# Patient Record
Sex: FEMALE | Race: WHITE | NOT HISPANIC OR LATINO | URBAN - METROPOLITAN AREA
[De-identification: names, ages, dates, MRNs, and addresses within clinical notes are randomized per-mention and may not be internally consistent; named-entity substitution may affect disease eponyms.]

---

## 2024-04-17 ENCOUNTER — HOSPITAL ENCOUNTER (OUTPATIENT)
Dept: RADIOLOGY | Facility: EXTERNAL LOCATION | Age: 69
Discharge: HOME | End: 2024-04-17

## 2024-04-17 DIAGNOSIS — R05.9 COUGH, UNSPECIFIED TYPE: ICD-10-CM

## 2024-09-12 ENCOUNTER — APPOINTMENT (OUTPATIENT)
Dept: PRIMARY CARE | Facility: CLINIC | Age: 69
End: 2024-09-12
Payer: COMMERCIAL

## 2024-09-12 VITALS
OXYGEN SATURATION: 93 % | TEMPERATURE: 98.3 F | BODY MASS INDEX: 24.55 KG/M2 | DIASTOLIC BLOOD PRESSURE: 68 MMHG | WEIGHT: 134.2 LBS | SYSTOLIC BLOOD PRESSURE: 102 MMHG | HEART RATE: 110 BPM

## 2024-09-12 DIAGNOSIS — Z12.31 SCREENING MAMMOGRAM FOR BREAST CANCER: Primary | ICD-10-CM

## 2024-09-12 DIAGNOSIS — Z78.0 ENCOUNTER FOR OSTEOPOROSIS SCREENING IN ASYMPTOMATIC POSTMENOPAUSAL PATIENT: ICD-10-CM

## 2024-09-12 DIAGNOSIS — L57.0 ACTINIC KERATOSIS: ICD-10-CM

## 2024-09-12 DIAGNOSIS — Z13.820 ENCOUNTER FOR OSTEOPOROSIS SCREENING IN ASYMPTOMATIC POSTMENOPAUSAL PATIENT: ICD-10-CM

## 2024-09-12 DIAGNOSIS — R29.898 WEAKNESS OF LEFT LOWER EXTREMITY: ICD-10-CM

## 2024-09-12 PROCEDURE — 1123F ACP DISCUSS/DSCN MKR DOCD: CPT | Performed by: NURSE PRACTITIONER

## 2024-09-12 PROCEDURE — 1158F ADVNC CARE PLAN TLK DOCD: CPT | Performed by: NURSE PRACTITIONER

## 2024-09-12 PROCEDURE — 99204 OFFICE O/P NEW MOD 45 MIN: CPT | Performed by: NURSE PRACTITIONER

## 2024-09-12 ASSESSMENT — ENCOUNTER SYMPTOMS
CONSTITUTIONAL NEGATIVE: 1
SHORTNESS OF BREATH: 0
APNEA: 0
VOMITING: 0
ARTHRALGIAS: 0
CHILLS: 0
SORE THROAT: 0
COUGH: 1
WEAKNESS: 1
CONFUSION: 0
SPEECH DIFFICULTY: 0
SLEEP DISTURBANCE: 0
PALPITATIONS: 0
NERVOUS/ANXIOUS: 0
DIARRHEA: 0
WHEEZING: 0
HEADACHES: 0
ACTIVITY CHANGE: 0
ROS SKIN COMMENTS: MOLE
DIZZINESS: 0
NAUSEA: 0
MYALGIAS: 0
CONSTIPATION: 0
ABDOMINAL PAIN: 0
FEVER: 0

## 2024-09-12 NOTE — ASSESSMENT & PLAN NOTE
Physical therapy to evaluate and treat as indicated  Will discuss CT of the head at upcoming appointment.  No neurological deficits noted

## 2024-09-27 ENCOUNTER — HOSPITAL ENCOUNTER (OUTPATIENT)
Dept: RADIOLOGY | Facility: CLINIC | Age: 69
Discharge: HOME | End: 2024-09-27
Payer: MEDICARE

## 2024-09-27 VITALS — BODY MASS INDEX: 24.66 KG/M2 | HEIGHT: 62 IN | WEIGHT: 134 LBS

## 2024-09-27 DIAGNOSIS — Z13.820 ENCOUNTER FOR OSTEOPOROSIS SCREENING IN ASYMPTOMATIC POSTMENOPAUSAL PATIENT: ICD-10-CM

## 2024-09-27 DIAGNOSIS — Z12.31 SCREENING MAMMOGRAM FOR BREAST CANCER: ICD-10-CM

## 2024-09-27 DIAGNOSIS — Z78.0 ENCOUNTER FOR OSTEOPOROSIS SCREENING IN ASYMPTOMATIC POSTMENOPAUSAL PATIENT: ICD-10-CM

## 2024-09-27 PROCEDURE — 77080 DXA BONE DENSITY AXIAL: CPT

## 2024-09-27 PROCEDURE — 77067 SCR MAMMO BI INCL CAD: CPT

## 2024-10-07 ENCOUNTER — APPOINTMENT (OUTPATIENT)
Dept: PRIMARY CARE | Facility: CLINIC | Age: 69
End: 2024-10-07
Payer: MEDICARE

## 2024-10-07 VITALS
SYSTOLIC BLOOD PRESSURE: 120 MMHG | BODY MASS INDEX: 24.48 KG/M2 | DIASTOLIC BLOOD PRESSURE: 79 MMHG | TEMPERATURE: 97 F | WEIGHT: 133 LBS | HEIGHT: 62 IN | OXYGEN SATURATION: 95 % | HEART RATE: 73 BPM

## 2024-10-07 DIAGNOSIS — L30.9 DERMATITIS: ICD-10-CM

## 2024-10-07 DIAGNOSIS — Z78.0 ENCOUNTER FOR OSTEOPOROSIS SCREENING IN ASYMPTOMATIC POSTMENOPAUSAL PATIENT: ICD-10-CM

## 2024-10-07 DIAGNOSIS — R53.1 WEAKNESS OF LEFT SIDE OF BODY: ICD-10-CM

## 2024-10-07 DIAGNOSIS — F17.210 CIGARETTE SMOKER: ICD-10-CM

## 2024-10-07 DIAGNOSIS — Z13.6 SCREENING FOR CARDIOVASCULAR CONDITION: ICD-10-CM

## 2024-10-07 DIAGNOSIS — Z13.820 ENCOUNTER FOR OSTEOPOROSIS SCREENING IN ASYMPTOMATIC POSTMENOPAUSAL PATIENT: ICD-10-CM

## 2024-10-07 DIAGNOSIS — Z00.00 ROUTINE GENERAL MEDICAL EXAMINATION AT HEALTH CARE FACILITY: Primary | ICD-10-CM

## 2024-10-07 PROCEDURE — 1124F ACP DISCUSS-NO DSCNMKR DOCD: CPT | Performed by: NURSE PRACTITIONER

## 2024-10-07 PROCEDURE — 1170F FXNL STATUS ASSESSED: CPT | Performed by: NURSE PRACTITIONER

## 2024-10-07 PROCEDURE — 93000 ELECTROCARDIOGRAM COMPLETE: CPT | Performed by: NURSE PRACTITIONER

## 2024-10-07 PROCEDURE — 4004F PT TOBACCO SCREEN RCVD TLK: CPT | Performed by: NURSE PRACTITIONER

## 2024-10-07 PROCEDURE — G0439 PPPS, SUBSEQ VISIT: HCPCS | Performed by: NURSE PRACTITIONER

## 2024-10-07 PROCEDURE — 1160F RVW MEDS BY RX/DR IN RCRD: CPT | Performed by: NURSE PRACTITIONER

## 2024-10-07 PROCEDURE — 1159F MED LIST DOCD IN RCRD: CPT | Performed by: NURSE PRACTITIONER

## 2024-10-07 PROCEDURE — 3008F BODY MASS INDEX DOCD: CPT | Performed by: NURSE PRACTITIONER

## 2024-10-07 RX ORDER — ALENDRONATE SODIUM 70 MG/1
70 TABLET ORAL
Qty: 4 TABLET | Refills: 11 | Status: SHIPPED | OUTPATIENT
Start: 2024-10-07 | End: 2025-10-07

## 2024-10-07 RX ORDER — TRIAMCINOLONE ACETONIDE 1 MG/G
CREAM TOPICAL 2 TIMES DAILY
Qty: 80 G | Refills: 0 | Status: SHIPPED | OUTPATIENT
Start: 2024-10-07

## 2024-10-07 ASSESSMENT — ACTIVITIES OF DAILY LIVING (ADL)
DRESSING: INDEPENDENT
MANAGING_FINANCES: INDEPENDENT
BATHING: INDEPENDENT
DOING_HOUSEWORK: INDEPENDENT
GROCERY_SHOPPING: INDEPENDENT
TAKING_MEDICATION: INDEPENDENT

## 2024-10-07 ASSESSMENT — ENCOUNTER SYMPTOMS
FATIGUE: 0
NERVOUS/ANXIOUS: 0
CONFUSION: 0
HEADACHES: 0
FEVER: 0
RESPIRATORY NEGATIVE: 1
VOMITING: 0
ABDOMINAL PAIN: 0
COUGH: 0
DIZZINESS: 0
SHORTNESS OF BREATH: 0
APNEA: 0
CONSTITUTIONAL NEGATIVE: 1
DEPRESSION: 0
CHILLS: 0
ACTIVITY CHANGE: 0
OCCASIONAL FEELINGS OF UNSTEADINESS: 0
PALPITATIONS: 0
LOSS OF SENSATION IN FEET: 0
NAUSEA: 0
WEAKNESS: 1

## 2024-10-07 ASSESSMENT — PATIENT HEALTH QUESTIONNAIRE - PHQ9
2. FEELING DOWN, DEPRESSED OR HOPELESS: NOT AT ALL
SUM OF ALL RESPONSES TO PHQ9 QUESTIONS 1 AND 2: 0
1. LITTLE INTEREST OR PLEASURE IN DOING THINGS: NOT AT ALL

## 2024-10-07 NOTE — PROGRESS NOTES
"Subjective   Reason for Visit: Cyndi Staples is an 69 y.o. female here for a Medicare Wellness visit.     Past Medical, Surgical, and Family History reviewed and updated in chart.    Reviewed all medications by prescribing practitioner or clinical pharmacist (such as prescriptions, OTCs, herbal therapies and supplements) and documented in the medical record.    Medicare Wellness Visit     Left side weakness: Discussed at last visit.  Has not scheduled with physical therapy.  Denies any additional neurological deficits. Difficulty ambulating.     Smoker: She is a daily cigarette smoker.  1 pack/day for 20+ years  Discussed low-dose CT screening of lung    Osteoporosis: Discussed DEXA scan showing low bone mineral.  Discussed treatment options with calcium and vitamin D.  Also patient opts for Fosamax    Dermatitis: Has occasional episodes of dermatitis.  Treats with triamcinolone.  Needs refill.  Has upcoming vacation        Patient Care Team:  SHINE Garvey as PCP - General (Family Medicine)     Review of Systems   Constitutional: Negative.  Negative for activity change, chills, fatigue and fever.   Respiratory: Negative.  Negative for apnea, cough and shortness of breath.    Cardiovascular:  Negative for chest pain and palpitations.   Gastrointestinal:  Negative for abdominal pain, nausea and vomiting.   Neurological:  Positive for weakness. Negative for dizziness and headaches.        Left side   Psychiatric/Behavioral:  Negative for confusion. The patient is not nervous/anxious.        Objective   Vitals:  /79   Pulse 73   Temp 36.1 °C (97 °F)   Ht 1.575 m (5' 2\")   Wt 60.3 kg (133 lb)   SpO2 95%   BMI 24.33 kg/m²       Physical Exam  Vitals reviewed.   Constitutional:       Appearance: Normal appearance.   Cardiovascular:      Rate and Rhythm: Normal rate and regular rhythm.      Pulses: Normal pulses.      Heart sounds: Normal heart sounds.   Pulmonary:      Effort: Pulmonary effort is " normal.      Breath sounds: Normal breath sounds.   Abdominal:      General: Bowel sounds are normal.   Neurological:      Mental Status: She is alert and oriented to person, place, and time.      Motor: Weakness present.      Coordination: Coordination normal.      Gait: Gait abnormal.      Deep Tendon Reflexes: Reflexes normal.   Psychiatric:         Mood and Affect: Mood normal.         Behavior: Behavior normal.       Assessment & Plan  Cigarette smoker  Smoking cessation encouraged.  CT low-dose lung screening ordered  Orders:    CT lung screening low dose; Future    Weakness of left side of body  Order CT head without IV contrast.  PT to evaluate and treat as indicated  Orders:    CT head wo IV contrast; Future    Dermatitis  Use as needed.  Also has dermatology referral  Orders:    triamcinolone (Kenalog) 0.1 % cream; Apply topically 2 times a day. Apply to affected area 1-2 times daily as needed. Avoid face and groin.    Encounter for osteoporosis screening in asymptomatic postmenopausal patient    Orders:    alendronate (Fosamax) 70 mg tablet; Take 1 tablet (70 mg) by mouth every 7 days. Take in the morning with a full glass of water, on an empty stomach, and do not take anything else by mouth or lie down for the next 30 min.  Risk/benefits/side effects discussed  Routine general medical examination at health care facility    Orders:    1 Year Follow Up In Primary Care - Wellness Exam; Future  Will have COVID vaccination and flu vaccination done at pharmacy  EKG done, DEXA review, mammogram reviewed  Screening for cardiovascular condition    Orders:    ECG 12 Lead  EKG shows left atrial enlargement.  Has CT low-dose lung screening/suspect calcium buildup???  Blood pressure well-controlled         Cardiac Risk Assessment  15 - 20 minutes were spent discussing Cardiovascular risk and, if needed, lifestyle modifications were recommended, including nutritional choices, exercise, and elimination of habits  contributing to risk.   Aspirin use/disuse was discussed following the guidelines below:  low dose ASA ( mg) should be considered:    If prior Heart Attack/Stroke/Peripheral vascular disease:  Generally recommend daily low dose aspirin unless extremely high bleeding risk (e.g., gastrointestinal).    If no prior Heart Attack/Stroke/Peripheral vascular disease:              Age over 70: Do not use Aspirin for prevention    Age less than 70 and 10-year cardiovascular disease risk is >20%: use low dose Aspirin for prevention.

## 2024-10-07 NOTE — ASSESSMENT & PLAN NOTE
Orders:    alendronate (Fosamax) 70 mg tablet; Take 1 tablet (70 mg) by mouth every 7 days. Take in the morning with a full glass of water, on an empty stomach, and do not take anything else by mouth or lie down for the next 30 min.  Risk/benefits/side effects discussed

## 2024-10-07 NOTE — ASSESSMENT & PLAN NOTE
Use as needed.  Also has dermatology referral  Orders:    triamcinolone (Kenalog) 0.1 % cream; Apply topically 2 times a day. Apply to affected area 1-2 times daily as needed. Avoid face and groin.

## 2024-10-07 NOTE — ASSESSMENT & PLAN NOTE
Orders:    1 Year Follow Up In Primary Care - Wellness Exam; Future  Will have COVID vaccination and flu vaccination done at pharmacy  EKG done, DEXA review, mammogram reviewed

## 2024-10-07 NOTE — PATIENT INSTRUCTIONS
Codes for insurance    CT of head, R53.1  CT/low-dose lung screening,  F17.210    Physical therapy, R53.1            products:   Take on an empty stomach before breakfast.  Take at least 30 minutes before the first food, drink, or drugs of the day.  Do not lie down for at least 30 minutes after taking this drug and until you eat your first food of the day.  Keep taking this drug as you have been told by your doctor or other health care provider, even if you feel well.  Tablets:   Take with a full glass of water.  Take with plain water only. Avoid taking with mineral water, milk, or other drinks.  Swallow whole. Do not chew, break, or crush.  Do not suck on this product.

## 2024-10-07 NOTE — ASSESSMENT & PLAN NOTE
Smoking cessation encouraged.  CT low-dose lung screening ordered  Orders:    CT lung screening low dose; Future

## 2024-10-07 NOTE — ASSESSMENT & PLAN NOTE
Order CT head without IV contrast.  PT to evaluate and treat as indicated  Orders:    CT head wo IV contrast; Future

## 2024-10-07 NOTE — ASSESSMENT & PLAN NOTE
Orders:    ECG 12 Lead  EKG shows left atrial enlargement.  Has CT low-dose lung screening/suspect calcium buildup???  Blood pressure well-controlled

## 2024-10-30 ENCOUNTER — HOSPITAL ENCOUNTER (OUTPATIENT)
Dept: RADIOLOGY | Facility: CLINIC | Age: 69
Discharge: HOME | End: 2024-10-30
Payer: MEDICARE

## 2024-10-30 DIAGNOSIS — R53.1 WEAKNESS OF LEFT SIDE OF BODY: ICD-10-CM

## 2024-10-30 DIAGNOSIS — F17.210 CIGARETTE SMOKER: ICD-10-CM

## 2024-10-30 PROCEDURE — 70450 CT HEAD/BRAIN W/O DYE: CPT

## 2024-10-30 PROCEDURE — 71271 CT THORAX LUNG CANCER SCR C-: CPT

## 2024-10-30 PROCEDURE — 70450 CT HEAD/BRAIN W/O DYE: CPT | Performed by: RADIOLOGY

## 2024-10-30 PROCEDURE — 71271 CT THORAX LUNG CANCER SCR C-: CPT | Performed by: RADIOLOGY

## 2024-11-08 ENCOUNTER — APPOINTMENT (OUTPATIENT)
Dept: PRIMARY CARE | Facility: CLINIC | Age: 69
End: 2024-11-08
Payer: MEDICARE

## 2024-11-08 VITALS
WEIGHT: 134.8 LBS | HEART RATE: 59 BPM | RESPIRATION RATE: 16 BRPM | SYSTOLIC BLOOD PRESSURE: 128 MMHG | DIASTOLIC BLOOD PRESSURE: 79 MMHG | BODY MASS INDEX: 24.66 KG/M2 | OXYGEN SATURATION: 95 % | TEMPERATURE: 96.6 F

## 2024-11-08 DIAGNOSIS — D16.9 OSTEOMA: ICD-10-CM

## 2024-11-08 DIAGNOSIS — Z13.29 THYROID DISORDER SCREEN: ICD-10-CM

## 2024-11-08 DIAGNOSIS — Z12.11 SCREENING FOR COLON CANCER: ICD-10-CM

## 2024-11-08 DIAGNOSIS — Z13.220 SCREENING, LIPID: ICD-10-CM

## 2024-11-08 DIAGNOSIS — J43.9 PULMONARY EMPHYSEMA, UNSPECIFIED EMPHYSEMA TYPE (MULTI): ICD-10-CM

## 2024-11-08 DIAGNOSIS — R29.898 WEAKNESS OF LEFT LOWER EXTREMITY: Primary | ICD-10-CM

## 2024-11-08 PROCEDURE — 99214 OFFICE O/P EST MOD 30 MIN: CPT | Performed by: NURSE PRACTITIONER

## 2024-11-08 PROCEDURE — 4004F PT TOBACCO SCREEN RCVD TLK: CPT | Performed by: NURSE PRACTITIONER

## 2024-11-08 PROCEDURE — 1160F RVW MEDS BY RX/DR IN RCRD: CPT | Performed by: NURSE PRACTITIONER

## 2024-11-08 PROCEDURE — 1124F ACP DISCUSS-NO DSCNMKR DOCD: CPT | Performed by: NURSE PRACTITIONER

## 2024-11-08 PROCEDURE — 1159F MED LIST DOCD IN RCRD: CPT | Performed by: NURSE PRACTITIONER

## 2024-11-08 ASSESSMENT — ENCOUNTER SYMPTOMS
RESPIRATORY NEGATIVE: 1
DIZZINESS: 0
HEADACHES: 0
APNEA: 0
NERVOUS/ANXIOUS: 0
WEAKNESS: 1
COUGH: 0
ACTIVITY CHANGE: 0
PALPITATIONS: 0
SHORTNESS OF BREATH: 0
ABDOMINAL PAIN: 0
VOMITING: 0
NUMBNESS: 1
CHILLS: 0
FATIGUE: 0
NAUSEA: 0
CONFUSION: 0
FEVER: 0
CONSTITUTIONAL NEGATIVE: 1

## 2024-11-08 NOTE — PROGRESS NOTES
Subjective   Patient ID: Cyndi Staples is a 69 y.o. female who presents for Left lower extremity weakness, Osteoma, Lung Nodule, and Osteoporosis.    Osteoporosis: Stable on Fosamax.  Discussed DEXA scan showing osteoporosis    Left lower extremity weakness: Discussed CT of the head/coincidental finding of osteoma.  Discussed sending patient to ENT for evaluation    Low back pain, left lower extremity weakness: Discussed with Dr. Kauffman for professional opinion.  Discussed having MRI L-spine completed due to weakness, pain and numbness of left lower extremity that is progressively worsening and affecting patient's activities of daily living.    Low-dose lung screening discussed: Findings of mild emphysema.  Asymptomatic.  Former smoker         Review of Systems   Constitutional: Negative.  Negative for activity change, chills, fatigue and fever.   Respiratory: Negative.  Negative for apnea, cough and shortness of breath.    Cardiovascular:  Negative for chest pain and palpitations.   Gastrointestinal:  Negative for abdominal pain, nausea and vomiting.   Neurological:  Positive for weakness and numbness. Negative for dizziness and headaches.   Psychiatric/Behavioral:  Negative for confusion. The patient is not nervous/anxious.        Objective   /79   Pulse 59   Temp 35.9 °C (96.6 °F)   Resp 16   Wt 61.1 kg (134 lb 12.8 oz)   SpO2 95%   BMI 24.66 kg/m²     Physical Exam  Vitals reviewed.   Constitutional:       Appearance: Normal appearance.   HENT:      Head: Normocephalic.   Cardiovascular:      Rate and Rhythm: Normal rate and regular rhythm.      Pulses: Normal pulses.      Heart sounds: Normal heart sounds.   Pulmonary:      Effort: Pulmonary effort is normal. No respiratory distress.      Breath sounds: Normal breath sounds. No wheezing.   Abdominal:      General: Bowel sounds are normal.   Musculoskeletal:      Left lower leg: No swelling or tenderness. No edema.   Neurological:      General: No  focal deficit present.      Mental Status: She is alert and oriented to person, place, and time.   Psychiatric:         Mood and Affect: Mood normal.         Behavior: Behavior normal.         Assessment/Plan   Problem List Items Addressed This Visit             ICD-10-CM    Weakness of left lower extremity - Primary R29.898     PT to evaluate and treat as indicated  MRI L-spine         Relevant Orders    MR lumbar spine wo IV contrast    Comprehensive Metabolic Panel    CBC and Auto Differential    Osteoma D16.9     Referral to ENT for evaluation         Relevant Orders    Referral to ENT    Pulmonary emphysema (Multi) J43.9     Albuterol inhaler as needed every 4-6 hours for shortness of breath or wheezing  Asymptomatic         Relevant Medications    albuterol 90 mcg/actuation aerosol powdr breath activated inhaler    Other Relevant Orders    Comprehensive Metabolic Panel    Screening for colon cancer Z12.11     Cologuard ordered         Relevant Orders    Cologuard® colon cancer screening     Other Visit Diagnoses         Codes    Screening, lipid     Z13.220    Relevant Orders    Lipid Panel    Thyroid disorder screen     Z13.29    Relevant Orders    TSH with reflex to Free T4 if abnormal

## 2024-12-04 ENCOUNTER — EVALUATION (OUTPATIENT)
Dept: PHYSICAL THERAPY | Facility: CLINIC | Age: 69
End: 2024-12-04
Payer: MEDICARE

## 2024-12-04 DIAGNOSIS — R29.898 WEAKNESS OF LEFT LOWER EXTREMITY: Primary | ICD-10-CM

## 2024-12-04 PROCEDURE — 97161 PT EVAL LOW COMPLEX 20 MIN: CPT | Mod: GP | Performed by: PHYSICAL THERAPIST

## 2024-12-04 PROCEDURE — 97110 THERAPEUTIC EXERCISES: CPT | Mod: GP | Performed by: PHYSICAL THERAPIST

## 2024-12-04 ASSESSMENT — ENCOUNTER SYMPTOMS
DEPRESSION: 0
OCCASIONAL FEELINGS OF UNSTEADINESS: 0
LOSS OF SENSATION IN FEET: 0

## 2024-12-04 ASSESSMENT — PAIN SCALES - GENERAL: PAINLEVEL_OUTOF10: 0 - NO PAIN

## 2024-12-04 ASSESSMENT — PATIENT HEALTH QUESTIONNAIRE - PHQ9
SUM OF ALL RESPONSES TO PHQ9 QUESTIONS 1 AND 2: 0
1. LITTLE INTEREST OR PLEASURE IN DOING THINGS: NOT AT ALL
2. FEELING DOWN, DEPRESSED OR HOPELESS: NOT AT ALL

## 2024-12-04 ASSESSMENT — PAIN - FUNCTIONAL ASSESSMENT: PAIN_FUNCTIONAL_ASSESSMENT: 0-10

## 2024-12-04 NOTE — PROGRESS NOTES
Physical Therapy    Physical Therapy Lumbar Spine Evaluation    Patient Name: Cyndi Staples  MRN: [id number]  Today's Date: 2024                      Visit Number: 1  Auth Dates: MN    Current Problem  Problem List Items Addressed This Visit             ICD-10-CM    Weakness of left lower extremity R29.898          General  Name and  confirmed with patient on this date.       Ambulatory Screening Summary     Screening  Frequency  Date Last Completed   Spiritual and Cultural Beliefs   Screening  each visit or episode of care 2024   Falls Risk Screening  every ambulatory visit 2024  9:58 AM   Pain Screening  annually at primary care visit     Domestic Violence screening  annually at primary care visit    Elder Abuse Screening  annually at primary care visit    Depression Screening  annually in the primary care setting 2024   Suicide Risk Screening  annually in the primary care setting    Nutrition and Food Insecurity   Screening  at least annually at primary care visit     Key Learner  annually in the primary care setting 2024   Drug Screen  2024  9:29 AM   Alcohol Screen  2024  9:29 AM   Advance Directive  2024         Precautions  Precautions  STEADI Fall Risk Score (The score of 4 or more indicates an increased risk of falling): 2  Precautions Comment: none       Pain  Pain Assessment: 0-10  0-10 (Numeric) Pain Score: 0 - No pain    SUBJECTIVE:   Chief complaint:  Pt is a 68 yo female who presents with left LE weakness and occasional aching in left leg. Pt reports that this started about 1 1/2 years ago. Pt reports that she doesn't have any pain. Feels like left leg gives out at times. Difficulty getting left leg into/out of car. Pt had work up and CVA was ruled out. Weakness affects gait. Ascends/descends stairs one at a time. Difficulty descending stairs due to left LE weakness. MRI ordered for back to be done in .    Pain Better:  Pain Worse:  Imaging:  Prior level of  function: Independent  Current limitations:   Home setup: Two story home  Work: Retired  Patients goal:Improve ability to walk  Prior tx: none    OBJECTIVE:        Spine ROM: WNL Unless documented below:  ROM (In degrees)   Flexion 80   Extension 3    Right Left   Side Bend 13 13   Rotation         Lower Extremity Strength: (5/5 unless noted)   RIGHT LEFT   Hip Flexion  3+/5   Hip Abduction     Knee Extension  3+/5   Knee Flexion  4/5   Ankle DF  4/5   Ankle EV     Gr. Toe Extension           Neurological symptoms none  Special tests:  Slump negative  SLR negative    Gait: Ambulates with decreased stance time on left LE during gait    Stairs: Pt ascends/descends stairs one at a time using right LE to lift/lower body weigt  Response to Ken Repeated Testing:      Other Measures  Oswestry Disablity Index (WALT): 31     TREATMENT:  EXERCISES Date 12/4/24 Date Date Date    REPS REPS REPS REPS          Nustep L5  8 mins                    Shuttle  DLP 4B  3 X 10      Shuttle SLP 2B  3 X 10      Shuttle TR/HR              Qhip Flexion 20#  2 X 10      Qhip Extension       Qhip Abduction 20#  2 X 10      Qhip  TKE              Q Quad       Q Hamstring               Back extension              Mid rows       Pull downs                                                                           ASSESSEMENT  Pt is a 69 y.o.  referred for physical therapy by Rita Ch APRN-CNP  for left LE weakness. Pt presents with left LE weakness, trunk weakness, altered gait. This patient would benefit from a therapy program to restore prior level of function, decrease pain, increase AROM, increase strength and improve posture.    The physical therapy prognosis is good for the patient to achieve their goals.   The pt tolerated therapy treatment today with no adverse effects.  Barriers to therapy/learning include:  none    PLAN  The pt will be seen 2 days a week for 4-8 weeks.      The pt has been educated about the risks and benefits  of physical therapy and gives consent for treatment.     The patient will benefit from physical therapy treatment to include: trunk and LE strengthening, gait training, modalities PRN     Goals:  Improve left LE strength by at least 1 MMT grade to improve ability to perform functional activities such as lifting left leg into car-4 weeks  Pt will be able to ambulate with no iheqdplgj-2-5 weeks  Pt will have sufficient functional strength of left LE to ascend/descend stairs with reciprocal pattern and no ofbdujjvinjn-9-2 weeks  Pt will be able to perform a squat to 70 degrees with equal weight distribution and ability to return to standing without UE support-6-8 weeks  Improve LEFS score > 45 to facilitate return to prior level of function-6-8 weeks

## 2024-12-07 LAB — NONINV COLON CA DNA+OCC BLD SCRN STL QL: NEGATIVE

## 2024-12-10 ENCOUNTER — TREATMENT (OUTPATIENT)
Dept: PHYSICAL THERAPY | Facility: CLINIC | Age: 69
End: 2024-12-10
Payer: MEDICARE

## 2024-12-10 DIAGNOSIS — R29.898 WEAKNESS OF LEFT LOWER EXTREMITY: Primary | ICD-10-CM

## 2024-12-10 PROCEDURE — 97110 THERAPEUTIC EXERCISES: CPT | Mod: GP,CQ

## 2024-12-10 ASSESSMENT — PAIN SCALES - GENERAL: PAINLEVEL_OUTOF10: 0 - NO PAIN

## 2024-12-10 ASSESSMENT — PAIN - FUNCTIONAL ASSESSMENT: PAIN_FUNCTIONAL_ASSESSMENT: 0-10

## 2024-12-10 NOTE — PROGRESS NOTES
"Physical Therapy    Physical Therapy Treatment    Patient Name: Cyndi Staples  MRN: [id number]  Today's Date: 12/10/2024    Time Entry:   Time Calculation  Start Time: 0801  Stop Time: 0840  Time Calculation (min): 39 min     PT Therapeutic Procedures Time Entry  Therapeutic Exercise Time Entry: 39               Assessment:   Min cueing needed to engage abdominals while doing the back extension and Q-hip exercises. Pt had moderate left LE fatigue post session. No increase in pain.    Plan:   Assess tolerance of today's visit. Progress LE strengthening exercises for return to PLOF.    Current Problem  1. Weakness of left lower extremity  Follow Up In Physical Therapy              Subjective   Pt reports no leg pain or resent episodes of her leg \"giving out\". Going up and down the stairs is challenging.    Precautions   Precautions  STEADI Fall Risk Score (The score of 4 or more indicates an increased risk of falling): 2  Precautions Comment: none      Pain  Pain Assessment  Pain Assessment: 0-10  0-10 (Numeric) Pain Score: 0 - No pain    Objective   Added shuttle HR, ham curl machine, back extension machine    Treatments:  EXERCISES Date 12/4/24 Date 12/10/24 Date Date    REPS REPS REPS REPS          Nustep L5  8 mins L5 10 min                   Shuttle  DLP 4B  3 X 10 3B 30x     Shuttle SLP 2B  3 X 10 2B 2x15 ea     Shuttle TR/HR  3B 3x10            Qhip Flexion 20#  2 X 10 20# 2x10 ea     Qhip Extension       Qhip Abduction 20#  2 X 10 20# 2x10 eaaa     Qhip  TKE              Q Quad  20# 2x10     Q Hamstring               Back extension  30# 3x10            Mid rows       Pull downs                                                                          OP EDUCATION:   Not today    Goals:  Improve left LE strength by at least 1 MMT grade to improve ability to perform functional activities such as lifting left leg into car-4 weeks  Pt will be able to ambulate with no jacxbvxah-9-7 weeks  Pt will have sufficient " functional strength of left LE to ascend/descend stairs with reciprocal pattern and no kpnhqpppmygr-1-5 weeks  Pt will be able to perform a squat to 70 degrees with equal weight distribution and ability to return to standing without UE support-6-8 weeks  Improve LEFS score > 45 to facilitate return to prior level of function-6-8 weeks

## 2024-12-16 ENCOUNTER — TREATMENT (OUTPATIENT)
Dept: PHYSICAL THERAPY | Facility: CLINIC | Age: 69
End: 2024-12-16
Payer: MEDICARE

## 2024-12-16 DIAGNOSIS — R29.898 WEAKNESS OF LEFT LOWER EXTREMITY: Primary | ICD-10-CM

## 2024-12-16 PROCEDURE — 97110 THERAPEUTIC EXERCISES: CPT | Mod: GP,CQ

## 2024-12-16 ASSESSMENT — PAIN - FUNCTIONAL ASSESSMENT: PAIN_FUNCTIONAL_ASSESSMENT: 0-10

## 2024-12-16 ASSESSMENT — PAIN SCALES - GENERAL: PAINLEVEL_OUTOF10: 0 - NO PAIN

## 2024-12-16 NOTE — PROGRESS NOTES
"Physical Therapy    Physical Therapy Treatment    Patient Name: Cyndi Staples  MRN: [id number]  Today's Date: 12/16/2024    Time Entry:   Time Calculation  Start Time: 0330  Stop Time: 0415  Time Calculation (min): 45 min     PT Therapeutic Procedures Time Entry  Therapeutic Exercise Time Entry: 45               Assessment:   Pt is challenged with left forward step ups because it is difficult to actively lift her leg.   No increase in left leg pain at the end of her session. Moderate fatigue.    Plan:   Assess tolerance of today's visit. Progress LE strengthening exercises for return to PLOF.    MMT left LE in deficit areas next visit.    Current Problem  1. Weakness of left lower extremity  Follow Up In Physical Therapy              Subjective   Pt reports that she felt \"ok\" after her last PT session. Lateral thigh soreness yesterday. Unsure of the cause.    Precautions   Precautions  STEADI Fall Risk Score (The score of 4 or more indicates an increased risk of falling): 2  Precautions Comment: none      Pain  Pain Assessment  Pain Assessment: 0-10  0-10 (Numeric) Pain Score: 0 - No pain    Objective   Added forward step ups and T-band extension and row DLS    Treatments:  EXERCISES Date 12/4/24 Date 12/10/24 Date 12/16/24 Date    REPS REPS REPS REPS          Nustep L5  8 mins L5 10 min L5 10 min                  Shuttle  DLP 4B  3 X 10 3B 30x 3B 30x    Shuttle SLP 2B  3 X 10 2B 2x15 ea 2B 2x15 ea    Shuttle TR/HR  3B 3x10 3B 3x10           Qhip Flexion 20#  2 X 10 20# 2x10 ea 20# 2x10    Qhip Extension       Qhip Abduction 20#  2 X 10 20# 2x10 ea 20# 2x10    Qhip  TKE              Q Quad       Q Hamstring   20# 2x10 20# 2x10           Back extension  30# 3x10 30# 3x10           Mid rows   Green 20x    Pull downs   Green 20x           Forward step ups   4\" 2x10 R/L    4 stairs                                                     OP EDUCATION:   Not today    Goals:  Improve left LE strength by at least 1 MMT grade to " improve ability to perform functional activities such as lifting left leg into car-4 weeks  Pt will be able to ambulate with no rhvpszprj-1-4 weeks  Pt will have sufficient functional strength of left LE to ascend/descend stairs with reciprocal pattern and no rnapyajrboiu-8-0 weeks  Pt will be able to perform a squat to 70 degrees with equal weight distribution and ability to return to standing without UE support-6-8 weeks  Improve LEFS score > 45 to facilitate return to prior level of function-6-8 weeks

## 2024-12-18 ENCOUNTER — TREATMENT (OUTPATIENT)
Dept: PHYSICAL THERAPY | Facility: CLINIC | Age: 69
End: 2024-12-18
Payer: MEDICARE

## 2024-12-18 DIAGNOSIS — R29.898 WEAKNESS OF LEFT LOWER EXTREMITY: Primary | ICD-10-CM

## 2024-12-18 PROCEDURE — 97110 THERAPEUTIC EXERCISES: CPT | Mod: GP | Performed by: PHYSICAL THERAPIST

## 2024-12-18 ASSESSMENT — PAIN SCALES - GENERAL: PAINLEVEL_OUTOF10: 0 - NO PAIN

## 2024-12-18 ASSESSMENT — PAIN - FUNCTIONAL ASSESSMENT: PAIN_FUNCTIONAL_ASSESSMENT: 0-10

## 2024-12-18 NOTE — PROGRESS NOTES
"Physical Therapy    Physical Therapy Treatment    Patient Name: Cyndi Staples  MRN: [id number]  Today's Date: 12/18/2024    Time Entry:   Time Calculation  Start Time: 0930  Stop Time: 1012  Time Calculation (min): 42 min     PT Therapeutic Procedures Time Entry  Therapeutic Exercise Time Entry: 42              Visit: 4  Auth: MN     Assessment:  Doing well with progression of strengthening exercises. Left LE fatigues quickly.    Plan:   Assess tolerance of today's visit. Progress LE strengthening exercises for return to PLOF.      Current Problem  1. Weakness of left lower extremity  Follow Up In Physical Therapy              Subjective   Pt reports that she does not have any pain today. Feels that she is tolerating PT well.    Precautions  Precautions  Precautions Comment: nonePrecautions  STEADI Fall Risk Score (The score of 4 or more indicates an increased risk of falling): 2  Precautions Comment: none      Pain  Pain Assessment  Pain Assessment: 0-10  0-10 (Numeric) Pain Score: 0 - No pain    Objective   Gait: pt ambulates with decreased stance time on left LE    Treatments:  EXERCISES Date 12/4/24 Date 12/10/24 Date 12/16/24 Date 12/18/24    REPS REPS REPS REPS          Nustep L5  8 mins L5 10 min L5 10 min L5  10 mins                 Shuttle  DLP 4B  3 X 10 3B 30x 3B 30x 3B  30X   Shuttle SLP 2B  3 X 10 2B 2x15 ea 2B 2x15 ea 2B 30X   Shuttle TR/HR  3B 3x10 3B 3x10 3B  30X          Qhip Flexion 20#  2 X 10 20# 2x10 ea 20# 2x10 30#  2 X 10   Qhip Extension       Qhip Abduction 20#  2 X 10 20# 2x10 ea 20# 2x10 30#  2 X 10   Qhip  TKE              Q Quad       Q Hamstring   20# 2x10 20# 2x10 20#  2 X 10          Back extension  30# 3x10 30# 3x10 30#  3 X 10          Mid rows   Green 20x Green 20X   Pull downs   Green 20x Green 20X          Forward step ups   4\" 2x10 R/L 4\" 2 X 10 R/L   4 stairs                                                     OP EDUCATION:   Not today    Goals:  Improve left LE strength by at " least 1 MMT grade to improve ability to perform functional activities such as lifting left leg into car-4 weeks  Pt will be able to ambulate with no alecilakz-7-1 weeks  Pt will have sufficient functional strength of left LE to ascend/descend stairs with reciprocal pattern and no lvzjijkwxrai-6-7 weeks  Pt will be able to perform a squat to 70 degrees with equal weight distribution and ability to return to standing without UE support-6-8 weeks  Improve LEFS score > 45 to facilitate return to prior level of function-6-8 weeks

## 2025-01-03 ENCOUNTER — TREATMENT (OUTPATIENT)
Dept: PHYSICAL THERAPY | Facility: CLINIC | Age: 70
End: 2025-01-03
Payer: MEDICARE

## 2025-01-03 DIAGNOSIS — R29.898 WEAKNESS OF LEFT LOWER EXTREMITY: Primary | ICD-10-CM

## 2025-01-03 PROCEDURE — 97110 THERAPEUTIC EXERCISES: CPT | Mod: GP,CQ

## 2025-01-03 ASSESSMENT — PAIN - FUNCTIONAL ASSESSMENT: PAIN_FUNCTIONAL_ASSESSMENT: 0-10

## 2025-01-03 ASSESSMENT — PAIN DESCRIPTION - DESCRIPTORS: DESCRIPTORS: ACHING

## 2025-01-03 ASSESSMENT — PAIN SCALES - GENERAL: PAINLEVEL_OUTOF10: 2

## 2025-01-03 NOTE — PROGRESS NOTES
Physical Therapy    Physical Therapy Treatment    Patient Name: Cyndi Staples  MRN: [id number]  Today's Date: 1/3/2025    Time Entry:   Time Calculation  Start Time: 0800  Stop Time: 0842  Time Calculation (min): 42 min     PT Therapeutic Procedures Time Entry  Therapeutic Exercise Time Entry: 42              Visit: 5  Auth: MN     Assessment:  Pt completed progressed exercises without increased left LE soreness.     Plan:    Progress LE strengthening exercises for return to PLOF.     MMT in deficit areas.      Current Problem  1. Weakness of left lower extremity  Follow Up In Physical Therapy              Subjective   Pt reports that her left leg has been achy the past couple days. She is unsure of the cause.    Precautions   Precautions  STEADI Fall Risk Score (The score of 4 or more indicates an increased risk of falling): 2  Precautions Comment: none      Pain  Pain Assessment  Pain Assessment: 0-10  0-10 (Numeric) Pain Score: 2  Pain Location: Leg  Pain Orientation: Left  Pain Descriptors: Aching  Pain Frequency: Intermittent    Objective   Increased exercise reps with strengthening exercises - see flow sheet  Gait: pt ambulates with decreased stance time on left LE    Treatments:  EXERCISES Date 12/4/24 Date 12/10/24 Date 12/16/24 Date 12/18/24 1/3/25    REPS REPS REPS REPS            Nustep L5  8 mins L5 10 min L5 10 min L5  10 mins L5 10 min                   Shuttle  DLP 4B  3 X 10 3B 30x 3B 30x 3B  30X 3B 3x15   Shuttle SLP 2B  3 X 10 2B 2x15 ea 2B 2x15 ea 2B 30X 2B 3x15   Shuttle TR/HR  3B 3x10 3B 3x10 3B  30X 3B 30x           Qhip Flexion 20#  2 X 10 20# 2x10 ea 20# 2x10 30#  2 X 10 30# 2x10   Qhip Extension        Qhip Abduction 20#  2 X 10 20# 2x10 ea 20# 2x10 30#  2 X 10 30# 2x10   Qhip  TKE                Q Quad        Q Hamstring   20# 2x10 20# 2x10 20#  2 X 10 20# 3x10           Back extension  30# 3x10 30# 3x10 30#  3 X 10 30# 3x15           Mid rows   Green 20x Green 20X Green 20x   Pull downs    "Green 20x Green 20X Green 20x           Forward step ups   4\" 2x10 R/L 4\" 2 X 10 R/L 4\" 2 X 10 R/L   4 stairs                                                            OP EDUCATION:   Not today    Goals:  Improve left LE strength by at least 1 MMT grade to improve ability to perform functional activities such as lifting left leg into car-4 weeks  Pt will be able to ambulate with no uqntvgogq-1-1 weeks  Pt will have sufficient functional strength of left LE to ascend/descend stairs with reciprocal pattern and no evofkwkdfipj-4-2 weeks  Pt will be able to perform a squat to 70 degrees with equal weight distribution and ability to return to standing without UE support-6-8 weeks  Improve LEFS score > 45 to facilitate return to prior level of function-6-8 weeks      "

## 2025-01-07 ENCOUNTER — TREATMENT (OUTPATIENT)
Dept: PHYSICAL THERAPY | Facility: CLINIC | Age: 70
End: 2025-01-07
Payer: MEDICARE

## 2025-01-07 DIAGNOSIS — R29.898 WEAKNESS OF LEFT LOWER EXTREMITY: Primary | ICD-10-CM

## 2025-01-07 PROCEDURE — 97110 THERAPEUTIC EXERCISES: CPT | Mod: GP,CQ

## 2025-01-07 ASSESSMENT — PAIN SCALES - GENERAL: PAINLEVEL_OUTOF10: 0 - NO PAIN

## 2025-01-07 ASSESSMENT — PAIN - FUNCTIONAL ASSESSMENT: PAIN_FUNCTIONAL_ASSESSMENT: 0-10

## 2025-01-07 NOTE — PROGRESS NOTES
"Physical Therapy    Physical Therapy Treatment    Patient Name: Cyndi Staples  MRN: [id number]  Today's Date: 1/7/2025    Time Entry:   Time Calculation  Start Time: 0802  Stop Time: 0847  Time Calculation (min): 45 min     PT Therapeutic Procedures Time Entry  Therapeutic Exercise Time Entry: 45              Visit: 6  Auth: MN     Assessment:  Issued and reviewed a HEP. Pt demonstrated good technique with all exercises. Pt is challenged with hip flexion strengthening exercises. Left LE fatigues quickly.   Left LE strength has improved since the IE.     Plan:    Progress LE strengthening exercises for return to PLOF.          Current Problem  1. Weakness of left lower extremity  Follow Up In Physical Therapy              Subjective   Pt reports having no left leg/hip pain today. She continues to have difficulty going up and down the stairs and getting in and out of her car secondary to \"feeling weak\".       Precautions   Precautions  STEADI Fall Risk Score (The score of 4 or more indicates an increased risk of falling): 2  Precautions Comment: none      Pain  Pain Assessment  Pain Assessment: 0-10  0-10 (Numeric) Pain Score: 0 - No pain  Pain Location: Leg  Pain Orientation: Left    Objective   MMT left LE  Hip flexion= 4-/5  Knee flexion= 4/5  Knee extension= 4/5    Gait: pt ambulates with decreased stance time on left LE    Treatments:  EXERCISES Date 12/4/24 Date 12/10/24 Date 12/16/24 Date 12/18/24 1/3/25 Date 1/7/25    REPS REPS REPS REPS              Nustep L5  8 mins L5 10 min L5 10 min L5  10 mins L5 10 min L5 10 min                     Shuttle  DLP 4B  3 X 10 3B 30x 3B 30x 3B  30X 3B 3x15 3B 3x15   Shuttle SLP 2B  3 X 10 2B 2x15 ea 2B 2x15 ea 2B 30X 2B 3x15 3B 3x15   Shuttle TR/HR  3B 3x10 3B 3x10 3B  30X 3B 30x 3B 30x            Qhip Flexion 20#  2 X 10 20# 2x10 ea 20# 2x10 30#  2 X 10 30# 2x10 30# 2x10   Qhip Extension         Qhip Abduction 20#  2 X 10 20# 2x10 ea 20# 2x10 30#  2 X 10 30# 2x10 30# 2x10 " "  Qhip  TKE                  Q Quad         Q Hamstring   20# 2x10 20# 2x10 20#  2 X 10 20# 3x10 Next             Back extension  30# 3x10 30# 3x10 30#  3 X 10 30# 3x15 30# 3x15            Mid rows   Green 20x Green 20X Green 20x    Pull downs   Green 20x Green 20X Green 20x             Forward step ups   4\" 2x10 R/L 4\" 2 X 10 R/L 4\" 2 X 10 R/L 6\" 2x10 R/L   4 stairs                                                            Issued HEP       OP EDUCATION:   Clamshells - 3 sets, X10 reps  1X daily   Supine straight leg raise - 3 sets, X10 reps 1X daily   Standing hip flexion - 3 sets, X10 reps 1X daily   Standing hip abduction - 3 sets, X10 reps 1X daily   Standing hip extension - 3 sets, X10 reps 1X daily    Goals:  Improve left LE strength by at least 1 MMT grade to improve ability to perform functional activities such as lifting left leg into car-4 weeks  Pt will be able to ambulate with no cvbjprtfp-4-1 weeks  Pt will have sufficient functional strength of left LE to ascend/descend stairs with reciprocal pattern and no evybxqjwwpdr-6-0 weeks  Pt will be able to perform a squat to 70 degrees with equal weight distribution and ability to return to standing without UE support-6-8 weeks  Improve LEFS score > 45 to facilitate return to prior level of function-6-8 weeks      "

## 2025-01-09 ENCOUNTER — TREATMENT (OUTPATIENT)
Dept: PHYSICAL THERAPY | Facility: CLINIC | Age: 70
End: 2025-01-09
Payer: MEDICARE

## 2025-01-09 DIAGNOSIS — R29.898 WEAKNESS OF LEFT LOWER EXTREMITY: Primary | ICD-10-CM

## 2025-01-09 PROCEDURE — 97110 THERAPEUTIC EXERCISES: CPT | Mod: GP,CQ

## 2025-01-09 ASSESSMENT — PAIN - FUNCTIONAL ASSESSMENT: PAIN_FUNCTIONAL_ASSESSMENT: 0-10

## 2025-01-09 ASSESSMENT — PAIN SCALES - GENERAL: PAINLEVEL_OUTOF10: 0 - NO PAIN

## 2025-01-09 NOTE — PROGRESS NOTES
"Physical Therapy    Physical Therapy Treatment    Patient Name: Cyndi Staples  MRN: [id number]  Today's Date: 1/9/2025    Time Entry:   Time Calculation  Start Time: 0800  Stop Time: 0845  Time Calculation (min): 45 min     PT Therapeutic Procedures Time Entry  Therapeutic Exercise Time Entry: 45              Visit: 7  Auth: MN     Assessment:  Pt is challenged with R/L SLB. Pt will start doing this exercise at home to increase stability.  Pt was able to ascend and descend 4 stairs with a reciprocal pattern and two hands on the rail. Going up the stairs is more difficult.    Plan:    Progress LE strengthening exercises for return to PLOF.          Current Problem  1. Weakness of left lower extremity  Follow Up In Physical Therapy              Subjective   No left hip/LE pain today. Pt has not felt like her leg will \"give out\" since beginning PT.    Precautions   Precautions  STEADI Fall Risk Score (The score of 4 or more indicates an increased risk of falling): 2  Precautions Comment: none      Pain  Pain Assessment  Pain Assessment: 0-10  0-10 (Numeric) Pain Score: 0 - No pain  Pain Location: Leg  Pain Orientation: Left    Objective   SLSB - floor  Left = 2-3 seconds    Added Q quad,  4 stairs and SLS    Treatments:  EXERCISES Date 1/9/25 Date  Date  Date     REPS REPS REPS REPS          Nustep L5  10 mins                    Shuttle  DLP 3B  3 X 15      Shuttle SLP 2B  3 X 15      Shuttle TR/HR 3B 30x             Qhip Flexion 30#  2 X 10      Qhip Extension       Qhip Abduction 30#  2 X 10      Qhip  TKE              Q Quad 10# 3x10      Q Hamstring  20# 3x15             Back extension 30# 3x15      Squat with ball       Mid rows       Pull downs              Forward step ups 6\" 2x10      4 stairs 5X      SLS 30\"Hx1                                             OP EDUCATION:   Clamshells - 3 sets, X10 reps  1X daily   Supine straight leg raise - 3 sets, X10 reps 1X daily   Standing hip flexion - 3 sets, X10 reps 1X " daily   Standing hip abduction - 3 sets, X10 reps 1X daily   Standing hip extension - 3 sets, X10 reps 1X daily    Goals:  Improve left LE strength by at least 1 MMT grade to improve ability to perform functional activities such as lifting left leg into car-4 weeks  Pt will be able to ambulate with no llhqqnkox-0-1 weeks  Pt will have sufficient functional strength of left LE to ascend/descend stairs with reciprocal pattern and no yjyrukqpplnj-9-4 weeks  Pt will be able to perform a squat to 70 degrees with equal weight distribution and ability to return to standing without UE support-6-8 weeks  Improve LEFS score > 45 to facilitate return to prior level of function-6-8 weeks

## 2025-01-13 ENCOUNTER — TREATMENT (OUTPATIENT)
Dept: PHYSICAL THERAPY | Facility: CLINIC | Age: 70
End: 2025-01-13
Payer: MEDICARE

## 2025-01-13 DIAGNOSIS — R29.898 WEAKNESS OF LEFT LOWER EXTREMITY: Primary | ICD-10-CM

## 2025-01-13 PROCEDURE — 97110 THERAPEUTIC EXERCISES: CPT | Mod: GP,CQ

## 2025-01-13 ASSESSMENT — PAIN - FUNCTIONAL ASSESSMENT: PAIN_FUNCTIONAL_ASSESSMENT: 0-10

## 2025-01-13 ASSESSMENT — PAIN SCALES - GENERAL: PAINLEVEL_OUTOF10: 0 - NO PAIN

## 2025-01-13 NOTE — PROGRESS NOTES
"Physical Therapy    Physical Therapy Treatment    Patient Name: Cyndi Staples  MRN: [id number]  Today's Date: 1/13/2025    Time Entry:   Time Calculation  Start Time: 0900  Stop Time: 0945  Time Calculation (min): 45 min     PT Therapeutic Procedures Time Entry  Therapeutic Exercise Time Entry: 45              Visit: 8  Auth: MN     Assessment:  Improved Right /Left SLS since last assessed. Left LE fatigue at the end of her treatment.  Pt continues to have difficulty lifting her left leg to get in and out of her car etc.     Plan:    Progress LE strengthening exercises for return to PLOF.    Reassess next visit.    Assess results of MRI.      Current Problem  1. Weakness of left lower extremity  Follow Up In Physical Therapy              Subjective   No left hip/LE pain today. Pt is compliant with her HEP.  MRI on 1/14/25.    Precautions   Precautions  STEADI Fall Risk Score (The score of 4 or more indicates an increased risk of falling): 2  Precautions Comment: none      Pain  Pain Assessment  Pain Assessment: 0-10  0-10 (Numeric) Pain Score: 0 - No pain    Objective   SLSB - floor  Left = 6 seconds  Right = 15 seconds    Treatments:  EXERCISES Date 1/9/25 Date 1/13/25 Date  Date     REPS REPS REPS REPS          Nustep L5  10 mins L5 10min                   Shuttle  DLP 3B  3 X 15 3B 3x15     Shuttle SLP 2B  3 X 15 2B 3x15     Shuttle TR/HR 3B 30x 3B 30x            Qhip Flexion 30#  2 X 10 30# 2x10     Qhip Extension       Qhip Abduction 30#  2 X 10 30# 2x10     Qhip  TKE              Q Quad 10# 3x10 10# 3x10     Q Hamstring  20# 3x15 20# 3x15            Back extension 30# 3x15 30# 3x15     Squat with ball       Mid rows  Blue 20x     Pull downs  Blue 20x            Forward step ups 6\" 2x10 6\" 2x10      4 stairs 5X 5X     SLS 30\"Hx1 30\"Hx1                                            OP EDUCATION:   Clamshells - 3 sets, X10 reps  1X daily   Supine straight leg raise - 3 sets, X10 reps 1X daily   Standing hip flexion - " 3 sets, X10 reps 1X daily   Standing hip abduction - 3 sets, X10 reps 1X daily   Standing hip extension - 3 sets, X10 reps 1X daily   Single leg stance with support - 2X daily    Goals:  Improve left LE strength by at least 1 MMT grade to improve ability to perform functional activities such as lifting left leg into car-4 weeks  Pt will be able to ambulate with no javghfyaa-9-4 weeks  Pt will have sufficient functional strength of left LE to ascend/descend stairs with reciprocal pattern and no vcsvgpbekoqc-4-1 weeks  Pt will be able to perform a squat to 70 degrees with equal weight distribution and ability to return to standing without UE support-6-8 weeks  Improve LEFS score > 45 to facilitate return to prior level of function-6-8 weeks

## 2025-01-14 ENCOUNTER — HOSPITAL ENCOUNTER (OUTPATIENT)
Dept: RADIOLOGY | Facility: CLINIC | Age: 70
Discharge: HOME | End: 2025-01-14
Payer: MEDICARE

## 2025-01-14 DIAGNOSIS — R29.898 WEAKNESS OF LEFT LOWER EXTREMITY: ICD-10-CM

## 2025-01-14 PROCEDURE — 72148 MRI LUMBAR SPINE W/O DYE: CPT | Performed by: RADIOLOGY

## 2025-01-14 PROCEDURE — 72148 MRI LUMBAR SPINE W/O DYE: CPT

## 2025-01-15 ENCOUNTER — TREATMENT (OUTPATIENT)
Dept: PHYSICAL THERAPY | Facility: CLINIC | Age: 70
End: 2025-01-15
Payer: MEDICARE

## 2025-01-15 DIAGNOSIS — R29.898 WEAKNESS OF LEFT LOWER EXTREMITY: Primary | ICD-10-CM

## 2025-01-15 PROCEDURE — 97110 THERAPEUTIC EXERCISES: CPT | Mod: GP | Performed by: PHYSICAL THERAPIST

## 2025-01-15 ASSESSMENT — PAIN - FUNCTIONAL ASSESSMENT: PAIN_FUNCTIONAL_ASSESSMENT: 0-10

## 2025-01-15 ASSESSMENT — PAIN SCALES - GENERAL: PAINLEVEL_OUTOF10: 0 - NO PAIN

## 2025-01-15 NOTE — PROGRESS NOTES
Physical Therapy    Physical Therapy Treatment/Reassessment    Patient Name: Cyndi Staples  MRN: [id number]  Today's Date: 1/15/2025    Time Entry:   Time Calculation  Start Time: 0900  Stop Time: 0945  Time Calculation (min): 45 min     PT Therapeutic Procedures Time Entry  Therapeutic Exercise Time Entry: 45              Visit: 8  Auth: MN     Assessment:  LE strength is improving gradually as well as functional activities such as ascending/descending stairs with reciprocal pattern. Continues to have altered gait and difficulty lifting left leg to get into/out of car. Pt needs continued PT to improve functional strength of LE's.    Plan:    Progress LE strengthening exercises for return to PLOF.          Current Problem  1. Weakness of left lower extremity  Follow Up In Physical Therapy              Subjective   Pt reports she continues to have some difficulty walking and lifting her left leg to get into car. Feels like she is gaining strength with PT.    MRI: multilevel degenerative changes of lumbar spine  Precautions   Precautions  STEADI Fall Risk Score (The score of 4 or more indicates an increased risk of falling): 2  Precautions Comment: none      Pain  Pain Assessment  Pain Assessment: 0-10  0-10 (Numeric) Pain Score: 0 - No pain    Objective   SLSB - floor  Left = 6 seconds  Right = 15 seconds    Left LE strength:  Hip flexion: 3+/5  Knee extension:4/5  Knee flexion: 4+/5  Ankle DF: 4+/5    Stairs: Pt ascends/descends stairs with reciprocal pattern, but has trendelenberg gait on left side. Requires hand rail to be able to ascend/descend reciprocally.    Squat: shifts slightly toward right LE, but better than at evaluation    Gait: Pt ambulates with trendelenberg    Functional Outcome Measure: LEFS 34    Treatments:  EXERCISES Date 1/9/25 Date 1/13/25 Date  1/15/25 Date     REPS REPS REPS REPS          Nustep L5  10 mins L5 10min L5  10 mins                  Shuttle  DLP 3B  3 X 15 3B 3x15 3B  3 X 15   "  Shuttle SLP 2B  3 X 15 2B 3x15 2B  3 X 15    Shuttle TR/HR 3B 30x 3B 30x 3B  30X           Qhip Flexion 30#  2 X 10 30# 2x10 30#  2 X 10    Qhip Extension       Qhip Abduction 30#  2 X 10 30# 2x10 30#  2 X 10    Qhip  TKE              Q Quad 10# 3x10 10# 3x10 10#  3 X 10    Q Hamstring  20# 3x15 20# 3x15 20#  3 X 15           Back extension 30# 3x15 30# 3x15 30#  3 X 15    Squat with ball       Mid rows  Blue 20x Blue  20X    Pull downs  Blue 20x Blue 20X           Forward step ups 6\" 2x10 6\" 2x10  6\"  2 X 10    4 stairs 5X 5X 5X    SLS 30\"Hx1 30\"Hx1 30\"H X 1                                           OP EDUCATION:   Clamshells - 3 sets, X10 reps  1X daily   Supine straight leg raise - 3 sets, X10 reps 1X daily   Standing hip flexion - 3 sets, X10 reps 1X daily   Standing hip abduction - 3 sets, X10 reps 1X daily   Standing hip extension - 3 sets, X10 reps 1X daily   Single leg stance with support - 2X daily    Goals:  Improve left LE strength by at least 1 MMT grade to improve ability to perform functional activities such as lifting left leg into car-4 weeks-1/15/25 PROGRESSING  Pt will be able to ambulate with no fwaqoemsl-0-7 weeks-1/15/25 PROGRESSING  Pt will have sufficient functional strength of left LE to ascend/descend stairs with reciprocal pattern and no dbezwqnyhwvo-3-2 weeks-1/15/25 PROGRESSING  Pt will be able to perform a squat to 70 degrees with equal weight distribution and ability to return to standing without UE support-6-8 weeks-1/15/25 PROGRESSING  Improve LEFS score > 45 to facilitate return to prior level of function-6-8 weeks-1/15/25 PROGRESSING      "

## 2025-01-22 ENCOUNTER — TREATMENT (OUTPATIENT)
Dept: PHYSICAL THERAPY | Facility: CLINIC | Age: 70
End: 2025-01-22
Payer: MEDICARE

## 2025-01-22 DIAGNOSIS — R29.898 WEAKNESS OF LEFT LOWER EXTREMITY: Primary | ICD-10-CM

## 2025-01-22 PROCEDURE — 97110 THERAPEUTIC EXERCISES: CPT | Mod: GP,CQ

## 2025-01-22 ASSESSMENT — PAIN - FUNCTIONAL ASSESSMENT: PAIN_FUNCTIONAL_ASSESSMENT: 0-10

## 2025-01-22 ASSESSMENT — PAIN SCALES - GENERAL: PAINLEVEL_OUTOF10: 0 - NO PAIN

## 2025-01-22 NOTE — PROGRESS NOTES
"Physical Therapy    Physical Therapy Treatment    Patient Name: Cyndi Staples  MRN: [id number]  Today's Date: 1/22/2025    Time Entry:   Time Calculation  Start Time: 0805  Stop Time: 0840  Time Calculation (min): 35 min     PT Therapeutic Procedures Time Entry  Therapeutic Exercise Time Entry: 35              Visit: 9  Auth: MN     Assessment:  Pt was unable to complete her exercise program secondary to time constraint. Resume held exercises next visit.    Plan:    Progress LE strengthening exercises for return to PLOF.          Current Problem  1. Weakness of left lower extremity  Follow Up In Physical Therapy              Subjective   \"Doing well\" this morning. Pt needs to leave a little early from PT today.   Pt 5 minute late.      Precautions   Precautions  STEADI Fall Risk Score (The score of 4 or more indicates an increased risk of falling): 2  Precautions Comment: none      Pain  Pain Assessment  Pain Assessment: 0-10  0-10 (Numeric) Pain Score: 0 - No pain    Objective   Left LE strength:  Hip flexion: 3+/5  Knee extension:4/5  Knee flexion: 4+/5  Ankle DF: 4+/5    Gait: Pt ambulates with trendelenberg    Treatments:  EXERCISES Date 1/9/25 Date 1/13/25 Date  1/15/25 Date 1/22/25    REPS REPS REPS REPS          Nustep L5  10 mins L5 10min L5  10 mins L5 10 min                 Shuttle  DLP 3B  3 X 15 3B 3x15 3B  3 X 15 3B 3x15   Shuttle SLP 2B  3 X 15 2B 3x15 2B  3 X 15 2B 3x15   Shuttle TR/HR 3B 30x 3B 30x 3B  30X 3B 30x          Qhip Flexion 30#  2 X 10 30# 2x10 30#  2 X 10 30# 2x10 ea   Qhip Extension       Qhip Abduction 30#  2 X 10 30# 2x10 30#  2 X 10 30# 2x10 ea   Qhip  TKE              Q Quad 10# 3x10 10# 3x10 10#  3 X 10 Not today   Q Hamstring  20# 3x15 20# 3x15 20#  3 X 15 Not today          Back extension 30# 3x15 30# 3x15 30#  3 X 15 Not today   Squat with ball       Mid rows  Blue 20x Blue  20X Not today   Pull downs  Blue 20x Blue 20X Not today   Lateral step ups    6\"2x10ea   Forward step ups " "6\" 2x10 6\" 2x10  6\"  2 X 10 6\"2x10ea   4 stairs 5X 5X 5X    SLS 30\"Hx1 30\"Hx1 30\"H X 1                                           OP EDUCATION:   Clamshells - 3 sets, X10 reps  1X daily   Supine straight leg raise - 3 sets, X10 reps 1X daily   Standing hip flexion - 3 sets, X10 reps 1X daily   Standing hip abduction - 3 sets, X10 reps 1X daily   Standing hip extension - 3 sets, X10 reps 1X daily   Single leg stance with support - 2X daily    Goals:  Improve left LE strength by at least 1 MMT grade to improve ability to perform functional activities such as lifting left leg into car-4 weeks-1/15/25 PROGRESSING  Pt will be able to ambulate with no lmzpcjfvk-8-9 weeks-1/15/25 PROGRESSING  Pt will have sufficient functional strength of left LE to ascend/descend stairs with reciprocal pattern and no jjppkxnlrydi-9-0 weeks-1/15/25 PROGRESSING  Pt will be able to perform a squat to 70 degrees with equal weight distribution and ability to return to standing without UE support-6-8 weeks-1/15/25 PROGRESSING  Improve LEFS score > 45 to facilitate return to prior level of function-6-8 weeks-1/15/25 PROGRESSING      "

## 2025-01-24 ENCOUNTER — TREATMENT (OUTPATIENT)
Dept: PHYSICAL THERAPY | Facility: CLINIC | Age: 70
End: 2025-01-24
Payer: MEDICARE

## 2025-01-24 DIAGNOSIS — R29.898 WEAKNESS OF LEFT LOWER EXTREMITY: Primary | ICD-10-CM

## 2025-01-24 PROCEDURE — 97110 THERAPEUTIC EXERCISES: CPT | Mod: GP | Performed by: PHYSICAL THERAPIST

## 2025-01-24 ASSESSMENT — PAIN SCALES - GENERAL: PAINLEVEL_OUTOF10: 2

## 2025-01-24 ASSESSMENT — PAIN - FUNCTIONAL ASSESSMENT: PAIN_FUNCTIONAL_ASSESSMENT: 0-10

## 2025-01-24 NOTE — PROGRESS NOTES
Physical Therapy    Physical Therapy Treatment    Patient Name: Cyndi Staples  MRN: [id number]  Today's Date: 1/24/2025    Time Entry:   Time Calculation  Start Time: 0945  Stop Time: 1024  Time Calculation (min): 39 min     PT Therapeutic Procedures Time Entry  Therapeutic Exercise Time Entry: 39              Visit: 11  Auth: MN     Assessment:  Overall patient is doing well. Continues to exhibit some functional weakness in left LE and gait deviation.    Plan:    Progress LE strengthening exercises for return to PLOF.          Current Problem  1. Weakness of left lower extremity  Follow Up In Physical Therapy              Subjective   Pt reports she is having some low grade soreness in lateral aspect of left thigh today. Unsure what caused it.    Precautions  Precautions  Precautions Comment: nonePrecautions  STEADI Fall Risk Score (The score of 4 or more indicates an increased risk of falling): 2  Precautions Comment: none      Pain  Pain Assessment  Pain Assessment: 0-10  0-10 (Numeric) Pain Score: 2  Pain Location:  (lateral thigh)    Objective   Left LE strength:  Hip flexion: 3+/5  Knee extension:4/5  Knee flexion: 4+/5  Ankle DF: 4+/5    Gait: Pt ambulates with trendelenberg gait    Treatments:  EXERCISES Date 1/9/25 Date 1/13/25 Date  1/15/25 Date 1/22/25 1/24/25    REPS REPS REPS REPS            Nustep L5  10 mins L5 10min L5  10 mins L5 10 min L5  10 mins                   Shuttle  DLP 3B  3 X 15 3B 3x15 3B  3 X 15 3B 3x15 3B  3 X 15   Shuttle SLP 2B  3 X 15 2B 3x15 2B  3 X 15 2B 3x15 2B  3 X 15   Shuttle TR/HR 3B 30x 3B 30x 3B  30X 3B 30x 3B  30X           Qhip Flexion 30#  2 X 10 30# 2x10 30#  2 X 10 30# 2x10 ea 30#  2 X 10   Qhip Extension        Qhip Abduction 30#  2 X 10 30# 2x10 30#  2 X 10 30# 2x10 ea 30#  2 X 10   Qhip  TKE                Q Quad 10# 3x10 10# 3x10 10#  3 X 10 Not today 10#  3 X 10   Q Hamstring  20# 3x15 20# 3x15 20#  3 X 15 Not today 20#  3 X 15           Back extension 30# 3x15 30#  "3x15 30#  3 X 15 Not today 40#  3 X 15   Squat with ball        Mid rows  Blue 20x Blue  20X Not today Blue 20X   Pull downs  Blue 20x Blue 20X Not today Blue 20X   Lateral step ups    6\"2x10ea 6\"  2 X 10   Forward step ups 6\" 2x10 6\" 2x10  6\"  2 X 10 6\"2x10ea 6\"  2 X 10   4 stairs 5X 5X 5X     SLS 30\"Hx1 30\"Hx1 30\"H X 1                                                 OP EDUCATION:   Clamshells - 3 sets, X10 reps  1X daily   Supine straight leg raise - 3 sets, X10 reps 1X daily   Standing hip flexion - 3 sets, X10 reps 1X daily   Standing hip abduction - 3 sets, X10 reps 1X daily   Standing hip extension - 3 sets, X10 reps 1X daily   Single leg stance with support - 2X daily    Goals:  Improve left LE strength by at least 1 MMT grade to improve ability to perform functional activities such as lifting left leg into car-4 weeks-1/15/25 PROGRESSING  Pt will be able to ambulate with no daqkrenxe-6-4 weeks-1/15/25 PROGRESSING  Pt will have sufficient functional strength of left LE to ascend/descend stairs with reciprocal pattern and no keqxntrxwwku-8-2 weeks-1/15/25 PROGRESSING  Pt will be able to perform a squat to 70 degrees with equal weight distribution and ability to return to standing without UE support-6-8 weeks-1/15/25 PROGRESSING  Improve LEFS score > 45 to facilitate return to prior level of function-6-8 weeks-1/15/25 PROGRESSING      "

## 2025-01-28 ENCOUNTER — TREATMENT (OUTPATIENT)
Dept: PHYSICAL THERAPY | Facility: CLINIC | Age: 70
End: 2025-01-28
Payer: MEDICARE

## 2025-01-28 DIAGNOSIS — R29.898 WEAKNESS OF LEFT LOWER EXTREMITY: Primary | ICD-10-CM

## 2025-01-28 PROCEDURE — 97110 THERAPEUTIC EXERCISES: CPT | Mod: GP,CQ

## 2025-01-28 ASSESSMENT — PAIN - FUNCTIONAL ASSESSMENT: PAIN_FUNCTIONAL_ASSESSMENT: 0-10

## 2025-01-28 ASSESSMENT — PAIN SCALES - GENERAL: PAINLEVEL_OUTOF10: 0 - NO PAIN

## 2025-01-28 NOTE — PROGRESS NOTES
"Physical Therapy    Physical Therapy Treatment    Patient Name: Cyndi Staples  MRN: [id number]  Today's Date: 1/28/2025    Time Entry:   Time Calculation  Start Time: 0845  Stop Time: 0930  Time Calculation (min): 45 min     PT Therapeutic Procedures Time Entry  Therapeutic Exercise Time Entry: 45              Visit: 12  Auth: MN     Assessment:  Pt tolerated exercise progression well. Mild fatigue post session.   Trendelenburg gait with ambulation.    Plan:    Progress LE strengthening exercises for return to PLOF.          Current Problem  1. Weakness of left lower extremity  Follow Up In Physical Therapy              Subjective   Pt reports that her left thigh is not painful today. Thigh pain \"comes and goes\".  Going up and down stairs continues to be difficult.    Precautions   Precautions  STEADI Fall Risk Score (The score of 4 or more indicates an increased risk of falling): 2  Precautions Comment: none      Pain  Pain Assessment  Pain Assessment: 0-10  0-10 (Numeric) Pain Score: 0 - No pain    Objective   Left LE strength:  Hip flexion: 3+/5  Knee extension:4/5  Ankle DF: 4+/5    Increased reps with strengthening exercises - see flow sheet    Treatments:  EXERCISES Date 1/28/25 Date  Date      REPS REPS REPS         Nustep L5  10 mins                 Shuttle  DLP 3B  3 X 15     Shuttle SLP 2B  3 X 15     Shuttle TR/HR 3B 30x           Qhip Flexion 30#  3 X 10     Qhip Extension      Qhip Abduction 30#  3 X 10     Qhip  TKE            Q Quad 10# 3x15     Q Hamstring  20# 3x15           Back extension 40# 3x15     Squat with ball      Mid rows Blue 30X     Pull downs Blue 30X     Lateral step ups 6\" 2x10 ea     Forward step ups 6\" 2x10     4 stairs      SLS                                        OP EDUCATION:   Clamshells - 3 sets, X10 reps  1X daily   Supine straight leg raise - 3 sets, X10 reps 1X daily   Standing hip flexion - 3 sets, X10 reps 1X daily   Standing hip abduction - 3 sets, X10 reps 1X daily   " Standing hip extension - 3 sets, X10 reps 1X daily   Single leg stance with support - 2X daily    Goals:  Improve left LE strength by at least 1 MMT grade to improve ability to perform functional activities such as lifting left leg into car-4 weeks-1/15/25 PROGRESSING  Pt will be able to ambulate with no tiussvvia-4-7 weeks-1/15/25 PROGRESSING  Pt will have sufficient functional strength of left LE to ascend/descend stairs with reciprocal pattern and no nvhohiubgyqd-4-1 weeks-1/15/25 PROGRESSING  Pt will be able to perform a squat to 70 degrees with equal weight distribution and ability to return to standing without UE support-6-8 weeks-1/15/25 PROGRESSING  Improve LEFS score > 45 to facilitate return to prior level of function-6-8 weeks-1/15/25 PROGRESSING

## 2025-01-30 ENCOUNTER — TREATMENT (OUTPATIENT)
Dept: PHYSICAL THERAPY | Facility: CLINIC | Age: 70
End: 2025-01-30
Payer: MEDICARE

## 2025-01-30 DIAGNOSIS — R29.898 WEAKNESS OF LEFT LOWER EXTREMITY: Primary | ICD-10-CM

## 2025-01-30 PROCEDURE — 97110 THERAPEUTIC EXERCISES: CPT | Mod: GP,CQ

## 2025-01-30 ASSESSMENT — PAIN SCALES - GENERAL: PAINLEVEL_OUTOF10: 0 - NO PAIN

## 2025-01-30 ASSESSMENT — PAIN - FUNCTIONAL ASSESSMENT: PAIN_FUNCTIONAL_ASSESSMENT: 0-10

## 2025-01-30 NOTE — PROGRESS NOTES
"Physical Therapy    Physical Therapy Treatment    Patient Name: Cyndi Staples  MRN: [id number]  Today's Date: 1/30/2025    Time Entry:   Time Calculation  Start Time: 0800  Stop Time: 0845  Time Calculation (min): 45 min     PT Therapeutic Procedures Time Entry  Therapeutic Exercise Time Entry: 45              Visit: 13  Auth: MN     Assessment:  Maintained exercise program secondary to fatigue. Left hip discomfort at the end of her session but no increase in thigh pain.    Plan:    Progress LE strengthening exercises for return to PLOF.          Current Problem  1. Weakness of left lower extremity  Follow Up In Physical Therapy              Subjective   Pt is tired this morning. No pain in the left thigh this morning.    Precautions   Precautions  STEADI Fall Risk Score (The score of 4 or more indicates an increased risk of falling): 2  Precautions Comment: none      Pain  Pain Assessment  Pain Assessment: 0-10  0-10 (Numeric) Pain Score: 0 - No pain    Objective   Left LE strength:  Hip flexion: 3+/5  Knee extension:4/5  Ankle DF: 4+/5    Trendelenburg gait with ambulation.      Treatments:  EXERCISES Date 1/28/25 Date 1/30/25 Date      REPS REPS REPS         Nustep L5  10 mins L5 10 min                Shuttle  DLP 3B  3 X 15 3B 3X15    Shuttle SLP 2B  3 X 15 2B 3X15    Shuttle TR/HR 3B 30x 3B 3X15          Qhip Flexion 30#  3 X 10 30# 2x10    Qhip Extension      Qhip Abduction 30#  3 X 10 30# 2x10    Qhip  TKE            Q Quad 10# 3x15 10# 3x15    Q Hamstring  20# 3x15 20# 3x15          Back extension 40# 3x15 40# 3x15    Squat with ball  next    Mid rows Blue 30X Blue 30x    Pull downs Blue 30X Blue 30x    Lateral step ups 6\" 2x10 ea 6\" 2x10    Forward step ups 6\" 2x10 6\" 2x10    4 stairs      SLS                                        OP EDUCATION:   Clamshells - 3 sets, X10 reps  1X daily   Supine straight leg raise - 3 sets, X10 reps 1X daily   Standing hip flexion - 3 sets, X10 reps 1X daily   Standing hip " abduction - 3 sets, X10 reps 1X daily   Standing hip extension - 3 sets, X10 reps 1X daily   Single leg stance with support - 2X daily    Goals:  Improve left LE strength by at least 1 MMT grade to improve ability to perform functional activities such as lifting left leg into car-4 weeks-1/15/25 PROGRESSING  Pt will be able to ambulate with no lqxjnaurr-4-6 weeks-1/15/25 PROGRESSING  Pt will have sufficient functional strength of left LE to ascend/descend stairs with reciprocal pattern and no suzuwjyymvbr-0-4 weeks-1/15/25 PROGRESSING  Pt will be able to perform a squat to 70 degrees with equal weight distribution and ability to return to standing without UE support-6-8 weeks-1/15/25 PROGRESSING  Improve LEFS score > 45 to facilitate return to prior level of function-6-8 weeks-1/15/25 PROGRESSING

## 2025-02-03 ENCOUNTER — APPOINTMENT (OUTPATIENT)
Dept: PHYSICAL THERAPY | Facility: CLINIC | Age: 70
End: 2025-02-03
Payer: MEDICARE

## 2025-02-03 ENCOUNTER — APPOINTMENT (OUTPATIENT)
Dept: PRIMARY CARE | Facility: CLINIC | Age: 70
End: 2025-02-03
Payer: MEDICARE

## 2025-02-03 DIAGNOSIS — R29.898 WEAKNESS OF LEFT LOWER EXTREMITY: Primary | ICD-10-CM

## 2025-02-04 ENCOUNTER — APPOINTMENT (OUTPATIENT)
Dept: PHYSICAL THERAPY | Facility: CLINIC | Age: 70
End: 2025-02-04
Payer: MEDICARE

## 2025-02-04 DIAGNOSIS — R29.898 WEAKNESS OF LEFT LOWER EXTREMITY: Primary | ICD-10-CM

## 2025-02-05 ENCOUNTER — TREATMENT (OUTPATIENT)
Dept: PHYSICAL THERAPY | Facility: CLINIC | Age: 70
End: 2025-02-05
Payer: MEDICARE

## 2025-02-05 DIAGNOSIS — R29.898 WEAKNESS OF LEFT LOWER EXTREMITY: Primary | ICD-10-CM

## 2025-02-05 PROCEDURE — 97110 THERAPEUTIC EXERCISES: CPT | Mod: GP,CQ

## 2025-02-05 ASSESSMENT — PAIN - FUNCTIONAL ASSESSMENT: PAIN_FUNCTIONAL_ASSESSMENT: 0-10

## 2025-02-05 ASSESSMENT — PAIN SCALES - GENERAL: PAINLEVEL_OUTOF10: 0 - NO PAIN

## 2025-02-05 NOTE — PROGRESS NOTES
"Physical Therapy    Physical Therapy Treatment    Patient Name: Cyndi Staples  MRN: [id number]  Today's Date: 2/5/2025    Time Entry:   Time Calculation  Start Time: 0800  Stop Time: 0845  Time Calculation (min): 45 min     PT Therapeutic Procedures Time Entry  Therapeutic Exercise Time Entry: 45              Visit: 14  Auth: MN     Assessment:  Pt needed moderate cueing to perform partial squats with the correct form. She finds it difficult to evenly distribute weight in both LE. She needed 2 UE assist when doing squats. Review technique and add to HEP next visit.    Plan:    Progress LE strengthening exercises for return to PLOF.          Current Problem  1. Weakness of left lower extremity  Follow Up In Physical Therapy              Subjective   Pt is doing well. She feels like she is getting stronger.    Precautions   Precautions  STEADI Fall Risk Score (The score of 4 or more indicates an increased risk of falling): 2  Precautions Comment: none      Pain  Pain Assessment  Pain Assessment: 0-10  0-10 (Numeric) Pain Score: 0 - No pain    Objective   Left LE strength:  Hip flexion: 3+/5    Added partial squats    Trendelenburg gait with ambulation.      Treatments:  EXERCISES Date 1/28/25 Date 1/30/25 Date  2/5/25    REPS REPS REPS         Nustep L5  10 mins L5 10 min L5 10 min               Shuttle  DLP 3B  3 X 15 3B 3X15 3B 3X15   Shuttle SLP 2B  3 X 15 2B 3X15 2B 3X15   Shuttle TR/HR 3B 30x 3B 3X15 3B 3X15         Qhip Flexion 30#  3 X 10 30# 2x10 30# 2x10   Qhip Extension      Qhip Abduction 30#  3 X 10 30# 2x10 30# 2x10   Qhip  TKE            Q Quad 10# 3x15 10# 3x15 10# 3x15   Q Hamstring  20# 3x15 20# 3x15 25# 3x15         Back extension 40# 3x15 40# 3x15    Partial squat  next 1X15 w/ V/C's   Mid rows Blue 30X Blue 30x    Pull downs Blue 30X Blue 30x    Lateral step ups 6\" 2x10 ea 6\" 2x10 8\" 1x10   Forward step ups 6\" 2x10 6\" 2x10 8\" 2x10   4 stairs      SLS                                        OP " EDUCATION:   Clamshells - 3 sets, X10 reps  1X daily   Supine straight leg raise - 3 sets, X10 reps 1X daily   Standing hip flexion - 3 sets, X10 reps 1X daily   Standing hip abduction - 3 sets, X10 reps 1X daily   Standing hip extension - 3 sets, X10 reps 1X daily   Single leg stance with support - 2X daily    Goals:  Improve left LE strength by at least 1 MMT grade to improve ability to perform functional activities such as lifting left leg into car-4 weeks-1/15/25 PROGRESSING  Pt will be able to ambulate with no agskkpahe-7-5 weeks-1/15/25 PROGRESSING  Pt will have sufficient functional strength of left LE to ascend/descend stairs with reciprocal pattern and no wqqfkzfoliks-3-1 weeks-1/15/25 PROGRESSING  Pt will be able to perform a squat to 70 degrees with equal weight distribution and ability to return to standing without UE support-6-8 weeks-1/15/25 PROGRESSING  Improve LEFS score > 45 to facilitate return to prior level of function-6-8 weeks-1/15/25 PROGRESSING

## 2025-02-06 ENCOUNTER — APPOINTMENT (OUTPATIENT)
Dept: PHYSICAL THERAPY | Facility: CLINIC | Age: 70
End: 2025-02-06
Payer: MEDICARE

## 2025-02-06 DIAGNOSIS — R29.898 WEAKNESS OF LEFT LOWER EXTREMITY: Primary | ICD-10-CM

## 2025-02-07 ENCOUNTER — TREATMENT (OUTPATIENT)
Dept: PHYSICAL THERAPY | Facility: CLINIC | Age: 70
End: 2025-02-07
Payer: MEDICARE

## 2025-02-07 DIAGNOSIS — R29.898 WEAKNESS OF LEFT LOWER EXTREMITY: Primary | ICD-10-CM

## 2025-02-07 PROCEDURE — 97110 THERAPEUTIC EXERCISES: CPT | Mod: GP,CQ

## 2025-02-07 ASSESSMENT — PAIN - FUNCTIONAL ASSESSMENT: PAIN_FUNCTIONAL_ASSESSMENT: 0-10

## 2025-02-07 ASSESSMENT — PAIN SCALES - GENERAL: PAINLEVEL_OUTOF10: 0 - NO PAIN

## 2025-02-07 NOTE — PROGRESS NOTES
"Physical Therapy    Physical Therapy Treatment    Patient Name: Cyndi Staples  MRN: [id number]  Today's Date: 2/7/2025    Time Entry:   Time Calculation  Start Time: 0803  Stop Time: 0850  Time Calculation (min): 47 min     PT Therapeutic Procedures Time Entry  Therapeutic Exercise Time Entry: 47              Visit: 15  Auth: MN     Assessment:  Issued a progressed HEP and blue Tband. Pt demonstrated good technique and form with these exercises. Pt is able to ascend and descend stairs with a reciprocal pattern and 2 hands on the rail.    Plan:    Progress LE strengthening exercises for return to PLOF.          Current Problem  1. Weakness of left lower extremity  Follow Up In Physical Therapy              Subjective   Pt is doing well. No left leg pain this morning.    Precautions   Precautions  STEADI Fall Risk Score (The score of 4 or more indicates an increased risk of falling): 2  Precautions Comment: none      Pain  Pain Assessment  Pain Assessment: 0-10  0-10 (Numeric) Pain Score: 0 - No pain    Objective   Left LE strength:  Hip flexion: 3+/5    Trendelenburg gait with ambulation.      Treatments:  EXERCISES Date 1/28/25 Date 1/30/25 Date  2/5/25 Date 2/7/25    REPS REPS REPS           Nustep L5  10 mins L5 10 min L5 10 min L5 10 min                 Shuttle  DLP 3B  3 X 15 3B 3X15 3B 3X15 3B 3x20   Shuttle SLP 2B  3 X 15 2B 3X15 2B 3X15 2B 3x20 ea   Shuttle TR/HR 3B 30x 3B 3X15 3B 3X15 3B 3x15          Qhip Flexion 30#  3 X 10 30# 2x10 30# 2x10 30#2x10   Qhip Extension       Qhip Abduction 30#  3 X 10 30# 2x10 30# 2x10 30#2x10   Qhip  TKE              Q Quad 10# 3x15 10# 3x15 10# 3x15 10# 3x15   Q Hamstring  20# 3x15 20# 3x15 25# 3x15 25# 3x15          Back extension 40# 3x15 40# 3x15  40# 3x15   Partial squat  next 1X15 w/ V/C's 2x10   Mid rows Blue 30X Blue 30x  HEP   Pull downs Blue 30X Blue 30x  HEP   Lateral step ups 6\" 2x10 ea 6\" 2x10 8\" 1x10 8\" 2x10   Forward step ups 6\" 2x10 6\" 2x10 8\" 2x10 8\" 2x10   4 " stairs    5x   SLS                                              OP EDUCATION:  HEP   Clamshells - 3 sets, X10 reps  1X daily   Supine straight leg raise - 3 sets, X10 reps 1X daily   Standing hip flexion - 3 sets, X10 reps 1X daily   Standing hip abduction - 3 sets, X10 reps 1X daily   Standing hip extension - 3 sets, X10 reps 1X daily   Single leg stance with support - 2X daily    Progressed HEP  Mini squat with counter support - 10 reps - 2 sets - 1X daily  Shoulder extension with resistance - 10 reps - 3 sets - 1X daily  Scapular retraction with resistance - 10 reps - 3 sets - 1X daily  Single arm shoulder extension with resistance - 10 reps - 3 sets - 1X daily    Goals:  Improve left LE strength by at least 1 MMT grade to improve ability to perform functional activities such as lifting left leg into car-4 weeks-1/15/25 PROGRESSING  Pt will be able to ambulate with no igiaddoyp-6-6 weeks-1/15/25 PROGRESSING  Pt will have sufficient functional strength of left LE to ascend/descend stairs with reciprocal pattern and no crbkhsmadyfe-8-9 weeks-1/15/25 PROGRESSING  Pt will be able to perform a squat to 70 degrees with equal weight distribution and ability to return to standing without UE support-6-8 weeks-1/15/25 PROGRESSING  Improve LEFS score > 45 to facilitate return to prior level of function-6-8 weeks-1/15/25 PROGRESSING

## 2025-02-11 ENCOUNTER — APPOINTMENT (OUTPATIENT)
Dept: PRIMARY CARE | Facility: CLINIC | Age: 70
End: 2025-02-11
Payer: MEDICARE

## 2025-02-11 ENCOUNTER — APPOINTMENT (OUTPATIENT)
Dept: PHYSICAL THERAPY | Facility: CLINIC | Age: 70
End: 2025-02-11
Payer: MEDICARE

## 2025-02-11 VITALS
DIASTOLIC BLOOD PRESSURE: 80 MMHG | WEIGHT: 136.6 LBS | OXYGEN SATURATION: 96 % | BODY MASS INDEX: 24.98 KG/M2 | HEART RATE: 72 BPM | TEMPERATURE: 96.6 F | SYSTOLIC BLOOD PRESSURE: 122 MMHG

## 2025-02-11 DIAGNOSIS — R29.898 WEAKNESS OF LEFT LOWER EXTREMITY: ICD-10-CM

## 2025-02-11 DIAGNOSIS — Z23 NEED FOR VACCINATION: ICD-10-CM

## 2025-02-11 DIAGNOSIS — M47.816 LUMBAR FACET ARTHROPATHY: Primary | ICD-10-CM

## 2025-02-11 DIAGNOSIS — J43.9 PULMONARY EMPHYSEMA, UNSPECIFIED EMPHYSEMA TYPE (MULTI): ICD-10-CM

## 2025-02-11 DIAGNOSIS — R29.898 WEAKNESS OF LEFT LOWER EXTREMITY: Primary | ICD-10-CM

## 2025-02-11 PROBLEM — R53.1 WEAKNESS OF LEFT SIDE OF BODY: Status: RESOLVED | Noted: 2024-10-07 | Resolved: 2025-02-11

## 2025-02-11 PROCEDURE — 90677 PCV20 VACCINE IM: CPT | Performed by: NURSE PRACTITIONER

## 2025-02-11 PROCEDURE — 1159F MED LIST DOCD IN RCRD: CPT | Performed by: NURSE PRACTITIONER

## 2025-02-11 PROCEDURE — 1123F ACP DISCUSS/DSCN MKR DOCD: CPT | Performed by: NURSE PRACTITIONER

## 2025-02-11 PROCEDURE — 99213 OFFICE O/P EST LOW 20 MIN: CPT | Performed by: NURSE PRACTITIONER

## 2025-02-11 PROCEDURE — 1160F RVW MEDS BY RX/DR IN RCRD: CPT | Performed by: NURSE PRACTITIONER

## 2025-02-11 PROCEDURE — G0009 ADMIN PNEUMOCOCCAL VACCINE: HCPCS | Performed by: NURSE PRACTITIONER

## 2025-02-11 ASSESSMENT — ENCOUNTER SYMPTOMS
FATIGUE: 0
SORE THROAT: 0
WHEEZING: 0
CHEST TIGHTNESS: 0
DIFFICULTY URINATING: 0
HEADACHES: 0
CHILLS: 0
NAUSEA: 0
DIZZINESS: 0
WEAKNESS: 1
PALPITATIONS: 0
ABDOMINAL PAIN: 0
FLANK PAIN: 0
ABDOMINAL DISTENTION: 0
COUGH: 0
BACK PAIN: 0
FEVER: 0
SHORTNESS OF BREATH: 0
CONSTIPATION: 0
DIARRHEA: 0

## 2025-02-11 NOTE — PROGRESS NOTES
Subjective   Patient ID: Cyndi Staples is a 69 y.o. female who presents for Left lower extremity weakness, Numbness and Tingling of bilateral feet , and Osteoma .    Left lower extremity weakness:  Patient seen on 11/8/2024 and complained of left lower extremity weakness.  Ordered physical therapy to evaluate and treat as indicated.  Patient has been following physical therapy closely.  MRI L-spine completed due to the left lower extremity weakness but also combination of complaints of bilateral feet numbness and tingling.  CT of the head also completed due to left lower extremity weakness that showed some small vessel ischemic changes ankle incidental 10 mm osteoma to the anterior left ethmoid air.  ENT consult completed and patient has upcoming CT of the sinuses with follow up ENT appt in April 2025    .=== 10/30/24 ===    CT HEAD WO IV CONTRAST    - Impression -  There is mild age-appropriate brain parenchymal volume loss.    Minimal nonspecific white matter changes are noted within the right  frontal lobe which while nonspecific, given the patient's age, may  represent sequelae of small-vessel ischemic change.    There is a 10 mm osteoma noted within an anterior left ethmoid air  cell.        .=== 01/14/25 ===    MR LUMBAR SPINE WO CONTRAST    - Impression -  Multilevel degenerative changes of the lumbar spine including mild  spinal canal stenosis at L4-L5. There is marked left facet  osteoarthropathy at L4-L5 with associated mild degenerative osseous  edema within the posterior elements.               Review of Systems   Constitutional:  Negative for chills, fatigue and fever.   HENT:  Negative for congestion and sore throat.    Respiratory:  Negative for cough, chest tightness, shortness of breath and wheezing.         Chronic cough    Cardiovascular:  Negative for chest pain and palpitations.   Gastrointestinal:  Negative for abdominal distention, abdominal pain, constipation, diarrhea and nausea.    Endocrine: Negative for cold intolerance and heat intolerance.   Genitourinary:  Negative for difficulty urinating and flank pain.   Musculoskeletal:  Negative for back pain.   Skin:  Negative for rash.   Neurological:  Positive for weakness. Negative for dizziness and headaches.       Objective   /80   Pulse 72   Temp 35.9 °C (96.6 °F) (Temporal)   Wt 62 kg (136 lb 9.6 oz)   SpO2 96%   BMI 24.98 kg/m²     Physical Exam  Constitutional:       Appearance: Normal appearance.   Cardiovascular:      Rate and Rhythm: Normal rate and regular rhythm.      Pulses: Normal pulses.      Heart sounds: Normal heart sounds.   Pulmonary:      Effort: Pulmonary effort is normal.      Breath sounds: Normal breath sounds.   Musculoskeletal:         General: Normal range of motion.      Cervical back: Normal range of motion.   Skin:     General: Skin is warm and dry.   Neurological:      Mental Status: She is alert and oriented to person, place, and time.         Assessment/Plan   Problem List Items Addressed This Visit             ICD-10-CM    Weakness of left lower extremity R29.898     Referrral to ortho spine for eval lumbar spine         Pulmonary emphysema (Multi) J43.9     Well controlled   Smoking cessation discussed            Lumbar facet arthropathy - Primary M47.816     Referral to ortho spine   Continue PT            Relevant Orders    Referral to Orthopaedic Surgery    Need for vaccination Z23     Prevnar 20mg IM          Relevant Orders    Pneumococcal conjugate vaccine, 20-valent (PREVNAR 20) (Completed)

## 2025-02-12 ENCOUNTER — APPOINTMENT (OUTPATIENT)
Dept: PHYSICAL THERAPY | Facility: CLINIC | Age: 70
End: 2025-02-12
Payer: MEDICARE

## 2025-02-12 DIAGNOSIS — R29.898 WEAKNESS OF LEFT LOWER EXTREMITY: Primary | ICD-10-CM

## 2025-02-13 ENCOUNTER — DOCUMENTATION (OUTPATIENT)
Dept: PHYSICAL THERAPY | Facility: CLINIC | Age: 70
End: 2025-02-13
Payer: MEDICARE

## 2025-02-13 DIAGNOSIS — R29.898 WEAKNESS OF LEFT LOWER EXTREMITY: Primary | ICD-10-CM

## 2025-02-13 NOTE — PROGRESS NOTES
Physical Therapy                 Therapy Communication Note    Patient Name: Cyndi Staples  MRN: [id number]  Department:   Room: Room/bed info not found  Today's Date: 2/13/2025     Discipline: Physical Therapy          Missed Visit Reason:      Missed Time: Cancel    Comment: canceled via my chart

## 2025-02-14 ENCOUNTER — TREATMENT (OUTPATIENT)
Dept: PHYSICAL THERAPY | Facility: CLINIC | Age: 70
End: 2025-02-14
Payer: MEDICARE

## 2025-02-14 DIAGNOSIS — R29.898 WEAKNESS OF LEFT LOWER EXTREMITY: Primary | ICD-10-CM

## 2025-02-14 PROCEDURE — 97110 THERAPEUTIC EXERCISES: CPT | Mod: GP | Performed by: PHYSICAL THERAPIST

## 2025-02-14 NOTE — PROGRESS NOTES
Physical Therapy      Physical Therapy Treatment/Discharge    Patient Name: Cyndi Staples  MRN: [id number]  Today's Date: 2/14/2025    Time Entry:   Time Calculation  Start Time: 0945  Stop Time: 1045  Time Calculation (min): 60 min     PT Therapeutic Procedures Time Entry  Therapeutic Exercise Time Entry: 50              Visit: 16   (12 in 2025)  Auth: MN    Non-Billable Time  Non-billable time: 10  Non-billable time reason: ReassessmentAssessment:  Left LE strength has improved with PT intervention, but patient continues to demonstrate functional weakness in left LE. Gait continues to have deviations. Pt is independent HEP and plans to join [employer] to continue strengthening, Has consult with orthopedics in March.    Plan:    Discharge to independent strengthening program.          Current Problem  1. Weakness of left lower extremity  Follow Up In Physical Therapy              Subjective   Pt reports that she feels her left leg is getting stronger with exercises. Can lift it into car on 's side, but needs to use hands to help her leg when getting in passenger side. Continues to have difficulty on stairs. No pain in back or leg.    Precautions   Precautions  STEADI Fall Risk Score (The score of 4 or more indicates an increased risk of falling): 2  Precautions Comment: none      Pain       Objective   Lumbar ROM:  Flexion: 80 degrees  Extension: 7 degrees    Left LE strength:   Hip flexion: 4+/5  Hip abduction: 4/5  Knee extension: 4+/5  Knee flexion: 5/5    Trendelenburg gait with ambulation.    Stairs: Pt ascends/descends stairs with reciprocal pattern and one hand rail.    Functional Outcome Measure: LEFS-40      Treatments:  EXERCISES Date 1/28/25 Date 1/30/25 Date  2/5/25 Date 2/7/25 2/14/25    REPS REPS REPS             Nustep L5  10 mins L5 10 min L5 10 min L5 10 min L5  10 mins                   Shuttle  DLP 3B  3 X 15 3B 3X15 3B 3X15 3B 3x20 3B  3 X 20   Shuttle SLP 2B  3 X 15 2B 3X15 2B 3X15 2B  "3x20 ea 2B  3 X 20   Shuttle TR/HR 3B 30x 3B 3X15 3B 3X15 3B 3x15 3B  3 X 15           Qhip Flexion 30#  3 X 10 30# 2x10 30# 2x10 30#2x10 30#  2 X 10   Qhip Extension        Qhip Abduction 30#  3 X 10 30# 2x10 30# 2x10 30#2x10 30#  2 X 10   Qhip  TKE                Q Quad 10# 3x15 10# 3x15 10# 3x15 10# 3x15 10#  3 X 15   Q Hamstring  20# 3x15 20# 3x15 25# 3x15 25# 3x15 25#  3 X 15           Back extension 40# 3x15 40# 3x15  40# 3x15 40#  3 X 15   Partial squat  next 1X15 w/ V/C's 2x10    Mid rows Blue 30X Blue 30x  HEP    Pull downs Blue 30X Blue 30x  HEP    Lateral step ups 6\" 2x10 ea 6\" 2x10 8\" 1x10 8\" 2x10    Forward step ups 6\" 2x10 6\" 2x10 8\" 2x10 8\" 2x10    4 stairs    5x 5X   SLS                                                    OP EDUCATION:  HEP   Clamshells - 3 sets, X10 reps  1X daily   Supine straight leg raise - 3 sets, X10 reps 1X daily   Standing hip flexion - 3 sets, X10 reps 1X daily   Standing hip abduction - 3 sets, X10 reps 1X daily   Standing hip extension - 3 sets, X10 reps 1X daily   Single leg stance with support - 2X daily    Progressed HEP  Mini squat with counter support - 10 reps - 2 sets - 1X daily  Shoulder extension with resistance - 10 reps - 3 sets - 1X daily  Scapular retraction with resistance - 10 reps - 3 sets - 1X daily  Single arm shoulder extension with resistance - 10 reps - 3 sets - 1X daily    Goals:  Improve left LE strength by at least 1 MMT grade to improve ability to perform functional activities such as lifting left leg into car-4 weeks-sGOAL MET  Pt will be able to ambulate with no fkzfttmqz-6-7 weeks-GOAL   Pt will have sufficient functional strength of left LE to ascend/descend stairs with reciprocal pattern and no rakfcxpliegl-1-4 weeks-GOAL NOT MET  Pt will be able to perform a squat to 70 degrees with equal weight distribution and ability to return to standing without UE support-6-8 weeks-GOAL NOT MET  Improve LEFS score > 45 to facilitate return to prior level " of function-6-8 weeks-GOAL NOT MET

## 2025-02-15 LAB
ALBUMIN SERPL-MCNC: 4.6 G/DL (ref 3.6–5.1)
ALP SERPL-CCNC: 58 U/L (ref 37–153)
ALT SERPL-CCNC: 10 U/L (ref 6–29)
ANION GAP SERPL CALCULATED.4IONS-SCNC: 9 MMOL/L (CALC) (ref 7–17)
AST SERPL-CCNC: 14 U/L (ref 10–35)
BASOPHILS # BLD AUTO: 29 CELLS/UL (ref 0–200)
BASOPHILS NFR BLD AUTO: 0.6 %
BILIRUB SERPL-MCNC: 0.4 MG/DL (ref 0.2–1.2)
BUN SERPL-MCNC: 19 MG/DL (ref 7–25)
CALCIUM SERPL-MCNC: 9.3 MG/DL (ref 8.6–10.4)
CHLORIDE SERPL-SCNC: 104 MMOL/L (ref 98–110)
CHOLEST SERPL-MCNC: 226 MG/DL
CHOLEST/HDLC SERPL: 3 (CALC)
CO2 SERPL-SCNC: 27 MMOL/L (ref 20–32)
CREAT SERPL-MCNC: 0.71 MG/DL (ref 0.5–1.05)
EGFRCR SERPLBLD CKD-EPI 2021: 93 ML/MIN/1.73M2
EOSINOPHIL # BLD AUTO: 108 CELLS/UL (ref 15–500)
EOSINOPHIL NFR BLD AUTO: 2.2 %
ERYTHROCYTE [DISTWIDTH] IN BLOOD BY AUTOMATED COUNT: 11.8 % (ref 11–15)
GLUCOSE SERPL-MCNC: 98 MG/DL (ref 65–99)
HCT VFR BLD AUTO: 46.3 % (ref 35–45)
HDLC SERPL-MCNC: 76 MG/DL
HGB BLD-MCNC: 15.3 G/DL (ref 11.7–15.5)
LDLC SERPL CALC-MCNC: 131 MG/DL (CALC)
LYMPHOCYTES # BLD AUTO: 1132 CELLS/UL (ref 850–3900)
LYMPHOCYTES NFR BLD AUTO: 23.1 %
MCH RBC QN AUTO: 30.1 PG (ref 27–33)
MCHC RBC AUTO-ENTMCNC: 33 G/DL (ref 32–36)
MCV RBC AUTO: 91 FL (ref 80–100)
MONOCYTES # BLD AUTO: 490 CELLS/UL (ref 200–950)
MONOCYTES NFR BLD AUTO: 10 %
NEUTROPHILS # BLD AUTO: 3141 CELLS/UL (ref 1500–7800)
NEUTROPHILS NFR BLD AUTO: 64.1 %
NONHDLC SERPL-MCNC: 150 MG/DL (CALC)
PLATELET # BLD AUTO: 242 THOUSAND/UL (ref 140–400)
PMV BLD REES-ECKER: 11.2 FL (ref 7.5–12.5)
POTASSIUM SERPL-SCNC: 4.6 MMOL/L (ref 3.5–5.3)
PROT SERPL-MCNC: 6.8 G/DL (ref 6.1–8.1)
RBC # BLD AUTO: 5.09 MILLION/UL (ref 3.8–5.1)
SODIUM SERPL-SCNC: 140 MMOL/L (ref 135–146)
TRIGL SERPL-MCNC: 91 MG/DL
TSH SERPL-ACNC: 1.87 MIU/L (ref 0.4–4.5)
WBC # BLD AUTO: 4.9 THOUSAND/UL (ref 3.8–10.8)

## 2025-03-20 ENCOUNTER — HOSPITAL ENCOUNTER (OUTPATIENT)
Dept: RADIOLOGY | Facility: HOSPITAL | Age: 70
Discharge: HOME | End: 2025-03-20
Payer: MEDICARE

## 2025-03-20 ENCOUNTER — OFFICE VISIT (OUTPATIENT)
Facility: HOSPITAL | Age: 70
End: 2025-03-20
Payer: MEDICARE

## 2025-03-20 VITALS — RESPIRATION RATE: 20 BRPM | HEART RATE: 76 BPM | DIASTOLIC BLOOD PRESSURE: 77 MMHG | SYSTOLIC BLOOD PRESSURE: 132 MMHG

## 2025-03-20 DIAGNOSIS — M21.70 LEG LENGTH DISCREPANCY: Primary | ICD-10-CM

## 2025-03-20 DIAGNOSIS — M16.12 ARTHRITIS OF LEFT HIP: ICD-10-CM

## 2025-03-20 DIAGNOSIS — M47.816 LUMBAR FACET ARTHROPATHY: ICD-10-CM

## 2025-03-20 DIAGNOSIS — M21.70 LEG LENGTH DISCREPANCY: ICD-10-CM

## 2025-03-20 PROCEDURE — G2211 COMPLEX E/M VISIT ADD ON: HCPCS | Performed by: PHYSICAL MEDICINE & REHABILITATION

## 2025-03-20 PROCEDURE — 1126F AMNT PAIN NOTED NONE PRSNT: CPT | Performed by: PHYSICAL MEDICINE & REHABILITATION

## 2025-03-20 PROCEDURE — 1123F ACP DISCUSS/DSCN MKR DOCD: CPT | Performed by: PHYSICAL MEDICINE & REHABILITATION

## 2025-03-20 PROCEDURE — 1159F MED LIST DOCD IN RCRD: CPT | Performed by: PHYSICAL MEDICINE & REHABILITATION

## 2025-03-20 PROCEDURE — 99214 OFFICE O/P EST MOD 30 MIN: CPT | Performed by: PHYSICAL MEDICINE & REHABILITATION

## 2025-03-20 PROCEDURE — 73502 X-RAY EXAM HIP UNI 2-3 VIEWS: CPT | Mod: LT

## 2025-03-20 PROCEDURE — 1160F RVW MEDS BY RX/DR IN RCRD: CPT | Performed by: PHYSICAL MEDICINE & REHABILITATION

## 2025-03-20 PROCEDURE — 99204 OFFICE O/P NEW MOD 45 MIN: CPT | Performed by: PHYSICAL MEDICINE & REHABILITATION

## 2025-03-20 ASSESSMENT — PAIN SCALES - GENERAL: PAINLEVEL_OUTOF10: 0-NO PAIN

## 2025-03-20 NOTE — PROGRESS NOTES
Patient ref by Dr. Ch (PCP).  Patient has been having difficulty walking for over 1 year.  No injury or notable cause of the change in gait. Patient has had physical therapy that ended in Feb of 2025 and patient has had a recent MRI that (according to patient)  showed changes in her spine and that is what prompted the referral.

## 2025-03-20 NOTE — PROGRESS NOTES
Physical Medicine and Rehabilitation MSK Consult  03/20/25       Chief Complaint:  Low back pain     HPI:  Cyndi Staples is a  69 y.o. f who presents to the clinic today  for evaluation of low back pain.  Onset: walking was off about 1.5 years ago, gradual  Started to limp but no pain, on the left side  States she has to lift her own leg into the car  Steps one at a time  No pain in back buttock or groin  Aggravating factors:  not clear  Alleviating factors: not sure  Severity:no pain  Numbness/Tingling: Yes -   Bowel or bladder incontinence: No  Pt's current medication regimen includes:      Treatment to date:  Physical therapy: Yes - helped w strength but still has a limp  Goes to [employer] now to maintain  Medications taken to date for this complaint include the following:   none  Prior injections: No       Imaging  Reviewed 03/20/25 w patient and personally  Ct head 10/2024  There is mild age-appropriate brain parenchymal volume loss.      Minimal nonspecific white matter changes are noted within the right  frontal lobe which while nonspecific, given the patient's age, may  represent sequelae of small-vessel ischemic change.      No hyperdense acute intracranial hemorrhage is noted.      There is no midline shift.      There is an incidental arachnoid granulation noted along the lateral  left transverse sinus.      Mild atherosclerotic calcifications are noted along the carotid  siphons.      There is a 10 mm osteoma noted within an anterior left ethmoid air  cell. The remaining paranasal sinuses and mastoid air cells are clear.      No acute fracture is noted.      IMPRESSION:  There is mild age-appropriate brain parenchymal volume loss.      Minimal nonspecific white matter changes are noted within the right  frontal lobe which while nonspecific, given the patient's age, may  represent sequelae of small-vessel ischemic change.      There is a 10 mm osteoma noted within an anterior left ethmoid  air  cell  MRI L spine 1/2025  FINDINGS:  This report assumes 5 non-rib bearing lumbar vertebral bodies. The  lowest intervertebral disc will be labeled L5-S1.      There is mild levocurvature. There is at most slight retrolisthesis  at T11-T12 and T12-L1. There is grade 1 anterolisthesis at L4-L5.  Vertebral body heights are maintained. There is mild intervertebral  disc height narrowing anteriorly at T11-T12 with chronic degenerative  endplate changes including osteophytic spurring. There is also mild  STIR hyperintense signal within this region which may reflect  degenerative osseous edema versus reactive hypervascularity. There is  disc desiccation at multiple levels. Marrow signal is overall within  normal limits.      The conus medullaris terminates at the level of L1-L2 and is  unremarkable in appearance.      At T12-L1, there is a small diffuse disc bulge. There is no spinal  canal stenosis or neural foraminal narrowing. There is no facet  osteoarthropathy.      At L1-L2, there is no striking posterior disc contour abnormality.  There is no spinal canal stenosis or neural foraminal narrowing.  There is a prominent left perineural cyst, likely incidental finding.  There is no facet osteoarthropathy.      At L2-L3, there is no posterior disc contour abnormality. There is no  spinal canal stenosis or neural foraminal narrowing. There is no  facet osteoarthropathy.      At L3-L4, there is a small diffuse disc bulge. There is no spinal  canal stenosis or neural foraminal narrowing. There is moderate  bilateral facet osteoarthropathy.      At L4-L5, there is mild spinal canal stenosis due to grade 1  anterolisthesis and ligamentum flavum thickening. There is no neural  foraminal narrowing. There is marked left and moderate right facet  osteoarthropathy. There is STIR hyperintense signal located within  the left L5 pedicle and articular processes, likely sequela of  degenerative edema.      At L5-S1, there is a  small central disc protrusion. There is no  spinal canal stenosis or neural foraminal narrowing. There is mild  right facet osteoarthropathy.      IMPRESSION:  Multilevel degenerative changes of the lumbar spine including mild  spinal canal stenosis at L4-L5. There is marked left facet  osteoarthropathy at L4-L5 with associated mild degenerative osseous  edema within the posterior elements.      This study was interpreted at [facility].     Past Surgical History:   Procedure Laterality Date    OTHER SURGICAL HISTORY  02/15/2021    Total hysterectomy abdominal        Patient Active Problem List    Diagnosis Date Noted    Lumbar facet arthropathy 02/11/2025    Need for vaccination 02/11/2025    Osteoma 11/08/2024    Pulmonary emphysema (Multi) 11/08/2024    Screening for colon cancer 11/08/2024    Dermatitis 10/07/2024    Cigarette smoker 10/07/2024    Routine general medical examination at health care facility 10/07/2024    Screening for cardiovascular condition 10/07/2024    Screening mammogram for breast cancer 09/12/2024    Encounter for osteoporosis screening in asymptomatic postmenopausal patient 09/12/2024    Actinic keratosis 09/12/2024    Weakness of left lower extremity 09/12/2024        Family History   Problem Relation Name Age of Onset    Breast cancer Mother's Sister  60 - 69        Current Outpatient Medications   Medication Sig Dispense Refill    albuterol 90 mcg/actuation aerosol powdr breath activated inhaler Inhale 2 puffs every 6 hours if needed for wheezing. 1 each 2    alendronate (Fosamax) 70 mg tablet Take 1 tablet (70 mg) by mouth every 7 days. Take in the morning with a full glass of water, on an empty stomach, and do not take anything else by mouth or lie down for the next 30 min. 4 tablet 11    triamcinolone (Kenalog) 0.1 % cream Apply topically 2 times a day. Apply to affected area 1-2 times daily as needed. Avoid face and groin. 80 g 0     No current  facility-administered medications for this visit.        No Known Allergies     Social History     Socioeconomic History    Marital status:    Tobacco Use    Smoking status: Every Day     Current packs/day: 1.00     Average packs/day: 1 pack/day for 55.2 years (55.2 ttl pk-yrs)     Types: Cigarettes     Start date: 1970     Passive exposure: Current    Smokeless tobacco: Never   Vaping Use    Vaping status: Never Used   Substance and Sexual Activity    Alcohol use: Not Currently     Comment: stopped drinking over 20 years  gave headaches    Drug use: Never    Sexual activity: Defer          Review of Systems:  Constitutional:  Denies fever or chills, malaise, weight changes.   Eyes:  Denies change in visual acuity   HENT:  Denies nasal congestion or sore throat   Respiratory:  Denies cough or shortness of breath   Cardiovascular:  Denies chest pain or edema   GI:  Denies abdominal pain, nausea, vomiting, bloody stools or diarrhea   :  Denies dysuria   Integument:  Denies rash   Neurologic:  As per HPI  MSK: Per above HPI  Endocrine:  Denies polyuria or polydipsia   Lymphatic:  Denies swollen glands   Psychiatric:  Denies depression or anxiety            PHYSICAL EXAM:  /77 (BP Location: Left arm, Patient Position: Sitting)   Pulse 76   Resp 20     General:  NAD, well developed, f      Psychiatric: appropriate mood & affect.   Cardiovascular:  Normal pedal pulses; no LE edema.  Respiratory:  Normal rate; unlabored breathing.  Skin:  Intact; no erythema; no ecchymosis or rash.  Lymphatic:  No lymphadenopathy or lymphedema.  NEURO:  Alert and appropriate. Speech fluent, conversing appropriately.   Motor:    Rt: HF 5/5, KE 5/5, KF 5/5, DF 5/5, EHL 5/5, PF 5/5.    Lt: , KE 5/5, KF 5/5, DF 5/5, EHL 5/5, PF 5/5.  Sensation:     Light touch: intact in the b/l L3-S1 dermatomes.    Reflexes:   3+ UE and LE  Hoffmans negative bl    Babinski's upgoing b/l; no clonus  Gait: antalgic  Leg  length discrepancy  MSK:  Inspection reveals no evidence of a pelvic obliqity   Spinal range of motion: Flexion to 60° degrees, Extension of 10 degrees.  Full R hip rom  + hip scour  Very limited left hip rom          Impression: Cyndi Staples is a 69 y.o. F w pmh of osteoporosis presents with left hip flexion weakness and antalgic painless gait. She has significant left hip rom deficit as well as leg length discrepancy most consistent etiology of symptoms is likely intra articular hip pathology.     Plan:  Orders Placed This Encounter   Procedures    XR hip left with pelvis when performed 1 view    1. Xr left hip   2. Will call w results, if severe oa will refer to ortho,if not will order EMG  3. Heal lift for leg length discrepancy  4. Fu prn  5. Of note has hyper reflexia UE and LE but symmetric and no cervical myelopathy symptoms       Thank you for the consultation.     Jaycee Hopkins MD  Physical Medicine and Rehabilitation

## 2025-03-24 ENCOUNTER — APPOINTMENT (OUTPATIENT)
Dept: PRIMARY CARE | Facility: CLINIC | Age: 70
End: 2025-03-24
Payer: MEDICARE

## 2025-03-24 VITALS
TEMPERATURE: 97.2 F | SYSTOLIC BLOOD PRESSURE: 106 MMHG | BODY MASS INDEX: 25.02 KG/M2 | OXYGEN SATURATION: 94 % | HEART RATE: 69 BPM | DIASTOLIC BLOOD PRESSURE: 71 MMHG | RESPIRATION RATE: 16 BRPM | WEIGHT: 136.8 LBS

## 2025-03-24 DIAGNOSIS — R39.89 SENSATION OF PRESSURE IN BLADDER AREA: ICD-10-CM

## 2025-03-24 DIAGNOSIS — E78.00 HYPERCHOLESTEREMIA: ICD-10-CM

## 2025-03-24 DIAGNOSIS — N81.10 FEMALE BLADDER PROLAPSE: Primary | ICD-10-CM

## 2025-03-24 DIAGNOSIS — R82.998 LEUKOCYTES IN URINE: ICD-10-CM

## 2025-03-24 LAB
POC APPEARANCE, URINE: CLEAR
POC BILIRUBIN, URINE: NEGATIVE
POC BLOOD, URINE: ABNORMAL
POC COLOR, URINE: YELLOW
POC GLUCOSE, URINE: NEGATIVE MG/DL
POC KETONES, URINE: ABNORMAL MG/DL
POC LEUKOCYTES, URINE: ABNORMAL
POC NITRITE,URINE: NEGATIVE
POC PH, URINE: 6 PH
POC PROTEIN, URINE: NEGATIVE MG/DL
POC SPECIFIC GRAVITY, URINE: 1.02
POC UROBILINOGEN, URINE: 0.2 EU/DL

## 2025-03-24 PROCEDURE — 81002 URINALYSIS NONAUTO W/O SCOPE: CPT | Performed by: NURSE PRACTITIONER

## 2025-03-24 PROCEDURE — 99215 OFFICE O/P EST HI 40 MIN: CPT | Performed by: NURSE PRACTITIONER

## 2025-03-24 PROCEDURE — 1123F ACP DISCUSS/DSCN MKR DOCD: CPT | Performed by: NURSE PRACTITIONER

## 2025-03-24 PROCEDURE — 1160F RVW MEDS BY RX/DR IN RCRD: CPT | Performed by: NURSE PRACTITIONER

## 2025-03-24 PROCEDURE — 1159F MED LIST DOCD IN RCRD: CPT | Performed by: NURSE PRACTITIONER

## 2025-03-24 RX ORDER — ATORVASTATIN CALCIUM 10 MG/1
10 TABLET, FILM COATED ORAL DAILY
Qty: 100 TABLET | Refills: 3 | Status: SHIPPED | OUTPATIENT
Start: 2025-03-24 | End: 2025-03-24

## 2025-03-24 RX ORDER — ATORVASTATIN CALCIUM 10 MG/1
10 TABLET, FILM COATED ORAL DAILY
Qty: 30 TABLET | Refills: 11 | Status: SHIPPED | OUTPATIENT
Start: 2025-03-24 | End: 2026-03-24

## 2025-03-24 ASSESSMENT — ENCOUNTER SYMPTOMS
CONSTITUTIONAL NEGATIVE: 1
APNEA: 0
ABDOMINAL PAIN: 0
DIZZINESS: 0
VOMITING: 0
FEVER: 0
HEMATURIA: 0
ACTIVITY CHANGE: 0
RESPIRATORY NEGATIVE: 1
HEADACHES: 0
NERVOUS/ANXIOUS: 0
PALPITATIONS: 0
WEAKNESS: 0
FATIGUE: 0
SHORTNESS OF BREATH: 0
NAUSEA: 0
FREQUENCY: 0
COUGH: 0
CONFUSION: 0
CHILLS: 0

## 2025-03-24 NOTE — ASSESSMENT & PLAN NOTE
PT to evaluate and treat as indicated/pelvic floor retraining  Pelvic exam shows prolapse  Referral to urogynecology

## 2025-03-24 NOTE — PROGRESS NOTES
Subjective   Patient ID: Cyndi Staples is a 69 y.o. female who presents for Urinary Pressure, Feels Bulge in Vaginal Canal, and Hyperlipidemia.    Patient complains of urinary pressure.  Reports that she feels her vaginal area will not completely close as usual.  Reports she feels of bulge in the vaginal area.  Denies incontinence, vaginal bleeding or pain.  History of abdominal hysterectomy    Recent fasting labs reviewed    Mixed hyperlipidemia: , total cholesterol 226 and non-.  ASCVD risk score 8%.  Patient opts to start statin  -Lipid Panel:   Collection Time: 02/14/25 10:56 AM       Result                      Value             Ref Range           CHOLESTEROL, TOTAL          226 (H)           <200 mg/dL          HDL CHOLESTEROL             76                > OR = 50 mg*       TRIGLYCERIDES               91                <150 mg/dL          LDL-CHOLESTEROL             131 (H)           mg/dL (calc)        CHOL/HDLC RATIO             3.0               <5.0 (calc)         NON HDL CHOLESTEROL         150 (H)           <130 mg/dL (*  -Comprehensive Metabolic Panel:   Collection Time: 02/14/25 10:56 AM       Result                      Value             Ref Range           GLUCOSE                     98                65 - 99 mg/dL       UREA NITROGEN (BUN)         19                7 - 25 mg/dL        CREATININE                  0.71              0.50 - 1.05 *       EGFR                        93                > OR = 60 mL*       SODIUM                      140               135 - 146 mm*       POTASSIUM                   4.6               3.5 - 5.3 mm*       CHLORIDE                    104               98 - 110 mmo*       CARBON DIOXIDE              27                20 - 32 mmol*       ELECTROLYTE BALANCE         9                 7 - 17 mmol/*       CALCIUM                     9.3               8.6 - 10.4 m*       PROTEIN, TOTAL              6.8               6.1 - 8.1 g/*       ALBUMIN                      4.6               3.6 - 5.1 g/*       BILIRUBIN, TOTAL            0.4               0.2 - 1.2 mg*       ALKALINE PHOSPHATASE        58                37 - 153 U/L        AST                         14                10 - 35 U/L         ALT                         10                6 - 29 U/L     -CBC and Auto Differential:   Collection Time: 02/14/25 10:56 AM       Result                      Value             Ref Range           WHITE BLOOD CELL COUNT      4.9               3.8 - 10.8 T*       RED BLOOD CELL COUNT        5.09              3.80 - 5.10 *       HEMOGLOBIN                  15.3              11.7 - 15.5 *       HEMATOCRIT                  46.3 (H)          35.0 - 45.0 %       MCV                         91.0              80.0 - 100.0*       MCH                         30.1              27.0 - 33.0 *       MCHC                        33.0              32.0 - 36.0 *       RDW                         11.8              11.0 - 15.0 %       PLATELET COUNT              242               140 - 400 Th*       MPV                         11.2              7.5 - 12.5 fL       ABSOLUTE NEUTROPHILS        3,141             1,500 - 7,80*       ABSOLUTE LYMPHOCYTES        1,132             850 - 3,900 *       ABSOLUTE MONOCYTES          490               200 - 950 ce*       ABSOLUTE EOSINOPHILS        108               15 - 500 rei*       ABSOLUTE BASOPHILS          29                0 - 200 cell*       NEUTROPHILS                 64.1              %                   LYMPHOCYTES                 23.1              %                   MONOCYTES                   10.0              %                   EOSINOPHILS                 2.2               %                   BASOPHILS                   0.6               %              -TSH with reflex to Free T4 if abnormal:   Collection Time: 02/14/25 10:56 AM       Result                      Value             Ref Range           TSH W/REFLEX TO FT4         1.87               0.40 - 4.50 *  -POCT UA (nonautomated) manually resulted:   Collection Time: 03/24/25 10:06 AM       Result                      Value             Ref Range           POC Color, Urine            Yellow            Straw, Yello*       POC Appearance, Urine       Clear             Clear               POC Glucose, Urine          NEGATIVE          NEGATIVE mg/*       POC Bilirubin, Urine        NEGATIVE          NEGATIVE            POC Ketones, Urine          15 (1+) (A)       NEGATIVE mg/*       POC Specific Gravity, *     1.020             1.005 - 1.035       POC Blood, Urine                              NEGATIVE        SMALL (1+) (A)       POC PH, Urine               6.0               No Reference*       POC Protein, Urine          NEGATIVE          NEGATIVE mg/*       POC Urobilinogen, Urine     0.2               0.2, 1.0 EU/*       Poc Nitrite, Urine          NEGATIVE          NEGATIVE            POC Leukocytes, Urine                         NEGATIVE        SMALL (1+) (A)          Review of Systems   Constitutional: Negative.  Negative for activity change, chills, fatigue and fever.   Respiratory: Negative.  Negative for apnea, cough and shortness of breath.    Cardiovascular:  Negative for chest pain and palpitations.   Gastrointestinal:  Negative for abdominal pain, nausea and vomiting.   Genitourinary:  Negative for frequency, hematuria, pelvic pain, vaginal bleeding and vaginal discharge.        Urinary pressure. Feels bulge in vaginal canal   Neurological:  Negative for dizziness, weakness and headaches.   Psychiatric/Behavioral:  Negative for confusion. The patient is not nervous/anxious.        Objective   /71   Pulse 69   Temp 36.2 °C (97.2 °F) (Temporal)   Resp 16   Wt 62.1 kg (136 lb 12.8 oz)   SpO2 94%   BMI 25.02 kg/m²     Physical Exam  Vitals reviewed.   Constitutional:       Appearance: Normal appearance.   HENT:      Head: Normocephalic.   Cardiovascular:      Rate and Rhythm:  Normal rate and regular rhythm.      Pulses: Normal pulses.      Heart sounds: Normal heart sounds.   Pulmonary:      Effort: Pulmonary effort is normal. No respiratory distress.      Breath sounds: Normal breath sounds. No wheezing.   Abdominal:      General: Bowel sounds are normal.   Genitourinary:     Vagina: Prolapsed vaginal walls present. No vaginal discharge or tenderness.      Uterus: Absent.    Neurological:      General: No focal deficit present.      Mental Status: She is alert and oriented to person, place, and time.   Psychiatric:         Mood and Affect: Mood normal.         Behavior: Behavior normal.         Assessment/Plan   Problem List Items Addressed This Visit             ICD-10-CM    Female bladder prolapse - Primary N81.10     PT to evaluate and treat as indicated/pelvic floor retraining  Pelvic exam shows prolapse  Referral to urogynecology         Relevant Orders    Referral to Urogynecology    Referral to Physical Therapy    Sensation of pressure in bladder area R39.89     Pelvic exam shows prolapse  Referral to urogynecology  Send urine for culture         Relevant Orders    POCT UA (nonautomated) manually resulted (Completed)    Hypercholesteremia E78.00     Start atorvastatin 10 mg daily  Recheck lipid panel in 6 months  Low saturated fat diet, low trans fat diet         Relevant Medications    atorvastatin (Lipitor) 10 mg tablet    Leukocytes in urine R82.998     Sent for culture.  Asymptomatic-no burning, fever         Relevant Orders    Urine Culture

## 2025-03-24 NOTE — ASSESSMENT & PLAN NOTE
Start atorvastatin 10 mg daily  Recheck lipid panel in 6 months  Low saturated fat diet, low trans fat diet

## 2025-03-24 NOTE — PATIENT INSTRUCTIONS
Maintain a healthy weight: Excess weight can put extra strain on the pelvic floor muscles.   Avoid straining during bowel movements: Constipation can worsen prolapse symptoms.   Quit smoking: Smoking can cause chronic coughing, which can put pressure on the pelvic floor.   Avoid heavy lifting: Activities that strain the pelvic floor muscles should be avoided or modified.   Consider dietary changes: Certain foods and drinks can irritate the bladder and worsen symptoms.

## 2025-03-26 LAB — BACTERIA UR CULT: NORMAL

## 2025-04-08 ENCOUNTER — EVALUATION (OUTPATIENT)
Dept: PHYSICAL THERAPY | Facility: CLINIC | Age: 70
End: 2025-04-08
Payer: MEDICARE

## 2025-04-08 ENCOUNTER — TELEPHONE (OUTPATIENT)
Dept: PHYSICAL MEDICINE AND REHAB | Facility: CLINIC | Age: 70
End: 2025-04-08

## 2025-04-08 DIAGNOSIS — N81.10 FEMALE BLADDER PROLAPSE: ICD-10-CM

## 2025-04-08 PROCEDURE — 97535 SELF CARE MNGMENT TRAINING: CPT | Mod: GP

## 2025-04-08 PROCEDURE — 97161 PT EVAL LOW COMPLEX 20 MIN: CPT | Mod: GP

## 2025-04-08 PROCEDURE — 97110 THERAPEUTIC EXERCISES: CPT | Mod: GP

## 2025-04-08 ASSESSMENT — PAIN - FUNCTIONAL ASSESSMENT: PAIN_FUNCTIONAL_ASSESSMENT: 0-10

## 2025-04-08 ASSESSMENT — ENCOUNTER SYMPTOMS
DEPRESSION: 0
OCCASIONAL FEELINGS OF UNSTEADINESS: 0
LOSS OF SENSATION IN FEET: 0

## 2025-04-08 ASSESSMENT — PAIN SCALES - GENERAL: PAINLEVEL_OUTOF10: 0 - NO PAIN

## 2025-04-08 NOTE — TELEPHONE ENCOUNTER
Called patient and left message regarding xray result.  Informed patient to call back if she has any questions or would like a referral for an ortho surgeon.

## 2025-04-08 NOTE — PROGRESS NOTES
Physical Therapy    EVALUATION AND TREATMENT    Name: Cyndi Staples  MRN: [id number]  : 1955  Today's Date: 25     Time Calculation  Start Time: 935  Stop Time:   Time Calculation (min): 53 min    Insurance:  Visit number: 1  Insurance Type: Medicare   Approved # of visits: ? Per pt, she only has 10 visits remaining and would like to save these   Authorization Needed: no  Cert Date Ends On: 2025    Precautions:  Precautions Comment: none     PMH: hypercholesteremia, osteoporosis, bladder prolapse, lumbar facet arthropathy, pulmonary emphysema  Fall Risk: no    Current Problem:  1. Female bladder prolapse  Referral to Physical Therapy          Subjective   General:  Reason for Referral: Bladder prolapse  Referred By: Rita Ch CNP  Chief complaint: bladder prolapse since 2025, improving slightly     Ob Gyn:  Notices it mainly with a bowel movement   No skin irritation, ok with wiping, wearing underwear   Appointment with Dr. Gleason    Hysterectomy from endometriosis , fibroids   2 vaginal births, + episiotomy   Not sexually active currently, no pain in the past   No topical hormones     Bladder:  Protection: 1 thin liner, not always saturated   Day time frequency: 7   Night time frequency: 1   Leakage: minimal   Urgency: no   Empty: incomplete emptying  No post void dribble    Bowel:   Leans more constipated, bowel movement every couple of days    Incomplete emptying     Recently completed PT with [facility] for L hip - minimal visits left   Recently retired as a  in [place]   Pain:  Pain Assessment: 0-10  0-10 (Numeric) Pain Score: 0 - No pain    Objective   *Pt declines a vaginal exam at this time*    Ortho Screen:  AROM:  Trunk and spine WFL   PROM/Joint Mobility:  Decreased L hip ER   Decreased L hip IR (more restricted than ER)  Appropriate HS length R/L  Appropriate sacral mobility   Strength:  Hip abduction 3/5 R/L  Hip extension 3/5 R/L  Special Tests:  - SI  cluster   Palpation:  TTP L glute min   Minimal to no visceral trigger points   Observation:   Sit to stand with poor initial weight acceptance LLE   Poor frontal plane support with gait   Static stand: increased trunk lean to the left, ASIS level. Slight ER LLE   Increased hip height L ASIS   SL stance:  Unable to weight bear through LLE without UE assist     Outcome Measure:   NIH CPSI pain 0 NIH CPSI urinary 5 NIH CPSI quality of life 4     Assessment:    Pt presenting to the clinic with acute onset of bladder prolapse. Her symptoms are relatively minor at this point, she is only feeling symptoms with a bowel movement. She has a history of a remote hysterectomy. She has completed L hip therapy with [facility] recently and been transitioning to a home program. She does not wish for a vaginal exam at this time and will perform whenever patient would like to be assessed. Anticipate pt has ligamentous laxity of abdominal viscera with superimposed hip weakness. She could benefit from continued PT to address these issues and prevent current status from progressing.     Plan:   PT Plan: Skilled PT  PT Frequency: 1 time per week  Duration: 12 weeks  Rehab Potential: Excellent  Plan of Care Agreement: Patient    *Pt does not believe she has insurance coverage and does not want to continue formal PT; will keep POC open in the event that she wishes to continue*     Planned interventions include: biofeedback, cryotherapy, education/instruction, electrical stimulation, gait training, home program, hot pack, kinesiotaping, manual therapy, neuromuscular re-education, self care/home management, therapeutic activities, and therapeutic exercises.   Access Code: [email or web address]  URL: [email or web address]  Date: 04/08/2025  Prepared by: Mary Kay Kunz    Exercises  - Supine Bridge  - 1 x daily - 7 x weekly - 2 sets - 10 reps - 5 seconds  hold  - Sidelying Hip Abduction  - 1 x daily - 7 x weekly - 2 sets - 10  reps - 5 seconds  hold  - Seated Hamstring Stretch  - 1 x daily - 7 x weekly - 2 sets - 5 reps - 20-30 seconds  hold  - Seated Figure 4 Piriformis Stretch  - 1 x daily - 7 x weekly - 2 sets - 5 reps - 20-30seconds  hold  - Supine with Legs Supported at 90/90  - 1 x daily - 7 x weekly - 2 sets - 1 reps - 2 minutes  hold    Careplan Goals:  1. Pt will be independent in HEP to maximize PT POC       Didi Kunz, PT

## 2025-04-18 ENCOUNTER — HOSPITAL ENCOUNTER (OUTPATIENT)
Dept: RADIOLOGY | Facility: CLINIC | Age: 70
Discharge: HOME | End: 2025-04-18
Payer: MEDICARE

## 2025-04-18 DIAGNOSIS — D38.5 NEOPLASM OF UNCERTAIN BEHAVIOR OF OTHER RESPIRATORY ORGANS: ICD-10-CM

## 2025-04-18 PROCEDURE — 70486 CT MAXILLOFACIAL W/O DYE: CPT

## 2025-05-06 ENCOUNTER — APPOINTMENT (OUTPATIENT)
Dept: PRIMARY CARE | Facility: CLINIC | Age: 70
End: 2025-05-06
Payer: MEDICARE

## 2025-06-02 ENCOUNTER — TELEPHONE (OUTPATIENT)
Dept: UROLOGY | Facility: CLINIC | Age: 70
End: 2025-06-02
Payer: MEDICARE

## 2025-06-03 ENCOUNTER — APPOINTMENT (OUTPATIENT)
Dept: UROLOGY | Facility: CLINIC | Age: 70
End: 2025-06-03
Payer: MEDICARE

## 2025-06-03 DIAGNOSIS — N81.9 FEMALE GENITAL PROLAPSE, UNSPECIFIED TYPE: Primary | ICD-10-CM

## 2025-06-03 DIAGNOSIS — R39.9 URINARY SYMPTOM OR SIGN: ICD-10-CM

## 2025-06-03 PROCEDURE — 99204 OFFICE O/P NEW MOD 45 MIN: CPT | Performed by: STUDENT IN AN ORGANIZED HEALTH CARE EDUCATION/TRAINING PROGRAM

## 2025-06-03 PROCEDURE — 57160 INSERT PESSARY/OTHER DEVICE: CPT | Performed by: STUDENT IN AN ORGANIZED HEALTH CARE EDUCATION/TRAINING PROGRAM

## 2025-06-03 PROCEDURE — 1159F MED LIST DOCD IN RCRD: CPT | Performed by: STUDENT IN AN ORGANIZED HEALTH CARE EDUCATION/TRAINING PROGRAM

## 2025-06-03 PROCEDURE — A4562 PESSARY, NON RUBBER,ANY TYPE: HCPCS | Performed by: STUDENT IN AN ORGANIZED HEALTH CARE EDUCATION/TRAINING PROGRAM

## 2025-06-03 NOTE — PROGRESS NOTES
"HISTORY OF PRESENT ILLNESS:  Cyndi Staples is a 69 y.o. female who presents today as a new patient. She was referred by OSMANY Garvey-CNP, JACK for prolapse. She is here for evaluation of a prolapse bladder. She does feel like there is a prolapse and is concerned about  it. She noticed it with wiping. She does wake up once at night. She also drinks coffee. She is not sexually active. She has a hx of a hysterectomy for endometriosis. Her two children were born vaginally. She denies any hx of other pelvic surgeries.          Past Medical History  She has no past medical history on file.    Surgical History  She has a past surgical history that includes Other surgical history (02/15/2021).     Social History  She reports that she has been smoking cigarettes. She started smoking about 55 years ago. She has a 55.4 pack-year smoking history. She has been exposed to tobacco smoke. She has never used smokeless tobacco. She reports that she does not currently use alcohol. She reports that she does not use drugs.    Family History  Family History[1]     Allergies  Patient has no known allergies.      A comprehensive 10+ review of systems was negative except for: see hpi                          PHYSICAL EXAMINATION:  BP Readings from Last 3 Encounters:   03/24/25 106/71   03/20/25 132/77   02/11/25 122/80      Wt Readings from Last 3 Encounters:   03/24/25 62.1 kg (136 lb 12.8 oz)   02/11/25 62 kg (136 lb 9.6 oz)   11/08/24 61.1 kg (134 lb 12.8 oz)      BMI: Estimated body mass index is 25.02 kg/m² as calculated from the following:    Height as of 10/7/24: 1.575 m (5' 2\").    Weight as of 3/24/25: 62.1 kg (136 lb 12.8 oz).  BSA: Estimated body surface area is 1.65 meters squared as calculated from the following:    Height as of 10/7/24: 1.575 m (5' 2\").    Weight as of 3/24/25: 62.1 kg (136 lb 12.8 oz).  HEENT: Normocephalic, atraumatic, PER EOMI, nonicteric, trachea normal, thyroid normal, oropharynx normal.  CARDIAC: " regular rate & rhythm, S1 & S2 normal.  No heaves, thrills, gallops or murmurs.  LUNGS: Clear to auscultation, no spinal or CV tenderness.  EXTREMITIES: No evidence of cyanosis, clubbing or edema.        Pelvic:  Chaperone for pelvic exam:   Genitourinary:  normal external genitalia, Bartholin's glands, [place]'s glands negative,   Urethra normal meatus, non-tender, no periurethral mass  Vaginal mucosa  normal  Adnexae  negative nontender, no masses  Atrophy positive    CST negative  Pelvic floor muscle contraction 4 /5    POP-Q (in supine position):       Aa 1     Ba 1     C -3              gh 4     pb 3     tvl 8              Ap -2     Bp -2     D     Rectal: no hemorrhoids, fissures or masses.    PVR (by ultrasound): 41         Assessment:  69 y.o. female presents as a new patient with prolapse,        Prolapse:    -I discussed treatment options including pessary and surgery, with regard to surgery discussed SCP vs native tissue repair; for SCP the failure rate is 5-10%, but associated with mesh complications including erosion <1% and SBO <0.5% vs native tissue repair which is associated with 20-30% failure rate, but no long term risk of complications and only~15% requiring additional treatment.     #3 donut pessary placed 6/3/2025    Follow up in 3 months       All questions and concerns were answered and addressed.  The patient expressed understanding and agrees with the plan.     Alonso Gleason MD    Scribe Attestation  By signing my name below, IMaría, Niurka   attest that this documentation has been prepared under the direction and in the presence of Alonso Gleason MD.         [1]   Family History  Problem Relation Name Age of Onset    Breast cancer Mother's Sister  60 - 69

## 2025-06-03 NOTE — LETTER
June 6, 2025     SHINE Garvey DNP  [address] [address]    Patient: Cyndi Staples   YOB: 1955   Date of Visit: 6/3/2025       Dear SHINE Mackenzie DNP:    Thank you for referring Cyndi Staples to me for evaluation. Below are my notes for this consultation.  If you have questions, please do not hesitate to call me. I look forward to following your patient along with you.       Sincerely,     Alonso Gleason MD      CC: No Recipients  ______________________________________________________________________________________    HISTORY OF PRESENT ILLNESS:  Cyndi Staples is a 69 y.o. female who presents today as a new patient. She was referred by SHINE Garvey DNP for prolapse. She is here for evaluation of a prolapse bladder. She does feel like there is a prolapse and is concerned about  it. She noticed it with wiping. She does wake up once at night. She also drinks coffee. She is not sexually active. She has a hx of a hysterectomy for endometriosis. Her two children were born vaginally. She denies any hx of other pelvic surgeries.          Past Medical History  She has no past medical history on file.    Surgical History  She has a past surgical history that includes Other surgical history (02/15/2021).     Social History  She reports that she has been smoking cigarettes. She started smoking about 55 years ago. She has a 55.4 pack-year smoking history. She has been exposed to tobacco smoke. She has never used smokeless tobacco. She reports that she does not currently use alcohol. She reports that she does not use drugs.    Family History  Family History[1]     Allergies  Patient has no known allergies.      A comprehensive 10+ review of systems was negative except for: see hpi                          PHYSICAL EXAMINATION:  BP Readings from Last 3 Encounters:   03/24/25 106/71   03/20/25 132/77   02/11/25 122/80      Wt Readings from Last 3  "Encounters:   03/24/25 62.1 kg (136 lb 12.8 oz)   02/11/25 62 kg (136 lb 9.6 oz)   11/08/24 61.1 kg (134 lb 12.8 oz)      BMI: Estimated body mass index is 25.02 kg/m² as calculated from the following:    Height as of 10/7/24: 1.575 m (5' 2\").    Weight as of 3/24/25: 62.1 kg (136 lb 12.8 oz).  BSA: Estimated body surface area is 1.65 meters squared as calculated from the following:    Height as of 10/7/24: 1.575 m (5' 2\").    Weight as of 3/24/25: 62.1 kg (136 lb 12.8 oz).  HEENT: Normocephalic, atraumatic, PER EOMI, nonicteric, trachea normal, thyroid normal, oropharynx normal.  CARDIAC: regular rate & rhythm, S1 & S2 normal.  No heaves, thrills, gallops or murmurs.  LUNGS: Clear to auscultation, no spinal or CV tenderness.  EXTREMITIES: No evidence of cyanosis, clubbing or edema.        Pelvic:  Chaperone for pelvic exam:   Genitourinary:  normal external genitalia, Bartholin's glands, [place]'s glands negative,   Urethra normal meatus, non-tender, no periurethral mass  Vaginal mucosa  normal  Adnexae  negative nontender, no masses  Atrophy positive    CST negative  Pelvic floor muscle contraction 4 /5    POP-Q (in supine position):       Aa 1     Ba 1     C -3              gh 4     pb 3     tvl 8              Ap -2     Bp -2     D     Rectal: no hemorrhoids, fissures or masses.    PVR (by ultrasound): 41         Assessment:  69 y.o. female presents as a new patient with prolapse,        Prolapse:    -I discussed treatment options including pessary and surgery, with regard to surgery discussed SCP vs native tissue repair; for SCP the failure rate is 5-10%, but associated with mesh complications including erosion <1% and SBO <0.5% vs native tissue repair which is associated with 20-30% failure rate, but no long term risk of complications and only~15% requiring additional treatment.     #3 donut pessary placed 6/3/2025    Follow up in 3 months       All questions and concerns were answered and addressed.  The patient " expressed understanding and agrees with the plan.     Alonso Gleason MD    Scribe Attestation  By signing my name below, I, Niurka Wright   attest that this documentation has been prepared under the direction and in the presence of Alonso Gleason MD.         [1]  Family History  Problem Relation Name Age of Onset   • Breast cancer Mother's Sister  60 - 69        [1]  Family History  Problem Relation Name Age of Onset   • Breast cancer Mother's Sister  60 - 69

## 2025-06-10 ENCOUNTER — APPOINTMENT (OUTPATIENT)
Dept: PRIMARY CARE | Facility: CLINIC | Age: 70
End: 2025-06-10
Payer: MEDICARE

## 2025-06-10 VITALS
DIASTOLIC BLOOD PRESSURE: 74 MMHG | OXYGEN SATURATION: 95 % | RESPIRATION RATE: 16 BRPM | TEMPERATURE: 97.3 F | HEART RATE: 73 BPM | WEIGHT: 132.4 LBS | SYSTOLIC BLOOD PRESSURE: 112 MMHG | BODY MASS INDEX: 24.22 KG/M2

## 2025-06-10 DIAGNOSIS — N81.10 FEMALE BLADDER PROLAPSE: ICD-10-CM

## 2025-06-10 DIAGNOSIS — M47.816 LUMBAR FACET ARTHROPATHY: Primary | ICD-10-CM

## 2025-06-10 PROCEDURE — 99213 OFFICE O/P EST LOW 20 MIN: CPT | Performed by: NURSE PRACTITIONER

## 2025-06-10 PROCEDURE — 1160F RVW MEDS BY RX/DR IN RCRD: CPT | Performed by: NURSE PRACTITIONER

## 2025-06-10 PROCEDURE — 1159F MED LIST DOCD IN RCRD: CPT | Performed by: NURSE PRACTITIONER

## 2025-06-10 ASSESSMENT — ENCOUNTER SYMPTOMS
RESPIRATORY NEGATIVE: 1
APNEA: 0
NERVOUS/ANXIOUS: 0
NAUSEA: 0
COUGH: 0
SHORTNESS OF BREATH: 0
CONFUSION: 0
VOMITING: 0
WEAKNESS: 0
CONSTITUTIONAL NEGATIVE: 1
CHILLS: 0
ACTIVITY CHANGE: 0
FATIGUE: 0
PALPITATIONS: 0
HEADACHES: 0
ABDOMINAL PAIN: 0
FEVER: 0
DIZZINESS: 0

## 2025-06-10 NOTE — PROGRESS NOTES
Subjective   Patient ID: Cyndi Staples is a 69 y.o. female who presents for Bladder Prolapse.    Follow-up prolapsed bladder  Patient seen 3/20/2025 due to concerns of bladder prolapse.  She was referred to urogynecology and seen on 6/3/2025.  Had donut pessary placed 6/3/2025.  Patient also treated by pelvic floor therapist.  No concerns on today.  Much improved symptoms    Lumbar facet arthropathy: Has disability placard for car.  Needs new prescription.  No other concerns         Review of Systems   Constitutional: Negative.  Negative for activity change, chills, fatigue and fever.   Respiratory: Negative.  Negative for apnea, cough and shortness of breath.    Cardiovascular:  Negative for chest pain and palpitations.   Gastrointestinal:  Negative for abdominal pain, nausea and vomiting.   Neurological:  Negative for dizziness, weakness and headaches.   Psychiatric/Behavioral:  Negative for confusion. The patient is not nervous/anxious.        Objective   /74   Pulse 73   Temp 36.3 °C (97.3 °F) (Temporal)   Resp 16   Wt 60.1 kg (132 lb 6.4 oz)   SpO2 95%   BMI 24.22 kg/m²     Physical Exam  Vitals reviewed.   Constitutional:       Appearance: Normal appearance.   HENT:      Head: Normocephalic.   Cardiovascular:      Rate and Rhythm: Normal rate and regular rhythm.      Pulses: Normal pulses.      Heart sounds: Normal heart sounds.   Pulmonary:      Effort: Pulmonary effort is normal. No respiratory distress.      Breath sounds: Normal breath sounds. No wheezing.   Abdominal:      General: Bowel sounds are normal.   Neurological:      General: No focal deficit present.      Mental Status: She is alert and oriented to person, place, and time.   Psychiatric:         Mood and Affect: Mood normal.         Behavior: Behavior normal.         Assessment/Plan   Problem List Items Addressed This Visit           ICD-10-CM    Lumbar facet arthropathy - Primary M47.816    Well-controlled with over-the-counter  meds.  Disability placard ordered and signed         Relevant Orders    Disability Placard    Female bladder prolapse N81.10    Status post donut pessary 6/3/2025  Continue Kegel exercises

## 2025-09-16 ENCOUNTER — APPOINTMENT (OUTPATIENT)
Dept: UROLOGY | Facility: CLINIC | Age: 70
End: 2025-09-16
Payer: MEDICARE

## 2025-10-14 ENCOUNTER — APPOINTMENT (OUTPATIENT)
Dept: PRIMARY CARE | Facility: CLINIC | Age: 70
End: 2025-10-14
Payer: MEDICARE